# Patient Record
Sex: FEMALE | Employment: OTHER | URBAN - METROPOLITAN AREA
[De-identification: names, ages, dates, MRNs, and addresses within clinical notes are randomized per-mention and may not be internally consistent; named-entity substitution may affect disease eponyms.]

---

## 2021-11-01 ENCOUNTER — OFFICE VISIT (OUTPATIENT)
Dept: OTOLARYNGOLOGY | Age: 86
End: 2021-11-01

## 2021-11-01 VITALS — WEIGHT: 153 LBS | HEIGHT: 63 IN | BODY MASS INDEX: 27.11 KG/M2

## 2021-11-01 DIAGNOSIS — H61.23 BILATERAL IMPACTED CERUMEN: Primary | ICD-10-CM

## 2021-11-01 PROCEDURE — 69210 REMOVE IMPACTED EAR WAX UNI: CPT | Performed by: PHYSICIAN ASSISTANT

## 2021-11-01 RX ORDER — AMLODIPINE BESYLATE 2.5 MG/1
TABLET ORAL
COMMUNITY
Start: 2021-10-02

## 2021-11-01 RX ORDER — LEVOTHYROXINE SODIUM 100 UG/1
TABLET ORAL
COMMUNITY
Start: 2021-09-07

## 2021-11-01 RX ORDER — CLOPIDOGREL BISULFATE 75 MG/1
75 TABLET ORAL DAILY
COMMUNITY

## 2021-11-01 RX ORDER — OMEPRAZOLE 10 MG/1
CAPSULE, DELAYED RELEASE ORAL
COMMUNITY

## 2021-11-01 RX ORDER — ALOGLIPTIN 6.25 MG/1
TABLET, FILM COATED ORAL
COMMUNITY

## 2021-11-01 RX ORDER — PRAVASTATIN SODIUM 20 MG
TABLET ORAL
COMMUNITY
Start: 2021-09-25

## 2021-11-01 RX ORDER — ASPIRIN 325 MG
325 TABLET ORAL
COMMUNITY

## 2022-04-16 ENCOUNTER — EXTERNAL RECORD (OUTPATIENT)
Dept: HEALTH INFORMATION MANAGEMENT | Facility: OTHER | Age: 87
End: 2022-04-16

## 2022-07-12 ENCOUNTER — OFFICE VISIT (OUTPATIENT)
Dept: OTOLARYNGOLOGY | Age: 87
End: 2022-07-12

## 2022-07-12 VITALS — BODY MASS INDEX: 25.99 KG/M2 | HEIGHT: 65 IN | WEIGHT: 156 LBS

## 2022-07-12 DIAGNOSIS — H61.23 BILATERAL IMPACTED CERUMEN: Primary | ICD-10-CM

## 2022-07-12 PROCEDURE — 69210 REMOVE IMPACTED EAR WAX UNI: CPT | Performed by: PHYSICIAN ASSISTANT

## 2023-02-18 ENCOUNTER — IMMUNIZATION (OUTPATIENT)
Dept: LAB | Age: 88
End: 2023-02-18
Attending: EMERGENCY MEDICINE
Payer: MEDICARE

## 2023-02-18 DIAGNOSIS — Z23 NEED FOR VACCINATION: Primary | ICD-10-CM

## 2023-02-18 PROCEDURE — 0134A SARSCOV2 VAC BVL 50MCG/0.5ML: CPT

## 2023-11-18 ENCOUNTER — TELEPHONE (OUTPATIENT)
Dept: INTERNAL MEDICINE CLINIC | Facility: CLINIC | Age: 88
End: 2023-11-18

## 2023-11-18 NOTE — TELEPHONE ENCOUNTER
Patient scheduled through [facility] appt for new patient with Dr. King Estevez. I called today to get insurance ID number to verify benefits before appointment.

## 2023-12-13 ENCOUNTER — OFFICE VISIT (OUTPATIENT)
Dept: INTERNAL MEDICINE CLINIC | Facility: CLINIC | Age: 88
End: 2023-12-13
Payer: MEDICARE

## 2023-12-13 VITALS
WEIGHT: 143 LBS | SYSTOLIC BLOOD PRESSURE: 130 MMHG | DIASTOLIC BLOOD PRESSURE: 80 MMHG | OXYGEN SATURATION: 99 % | HEART RATE: 96 BPM

## 2023-12-13 DIAGNOSIS — R42 DYSEQUILIBRIUM: ICD-10-CM

## 2023-12-13 DIAGNOSIS — N18.30 STAGE 3 CHRONIC KIDNEY DISEASE, UNSPECIFIED WHETHER STAGE 3A OR 3B CKD (HCC): ICD-10-CM

## 2023-12-13 DIAGNOSIS — N39.0 FREQUENT UTI: ICD-10-CM

## 2023-12-13 DIAGNOSIS — R26.9 GAIT DISORDER: ICD-10-CM

## 2023-12-13 DIAGNOSIS — N18.30 TYPE 2 DIABETES MELLITUS WITH STAGE 3 CHRONIC KIDNEY DISEASE, WITHOUT LONG-TERM CURRENT USE OF INSULIN, UNSPECIFIED WHETHER STAGE 3A OR 3B CKD (HCC): Primary | ICD-10-CM

## 2023-12-13 DIAGNOSIS — N39.41 URGE INCONTINENCE: ICD-10-CM

## 2023-12-13 DIAGNOSIS — E03.9 ACQUIRED HYPOTHYROIDISM: ICD-10-CM

## 2023-12-13 DIAGNOSIS — E11.22 TYPE 2 DIABETES MELLITUS WITH STAGE 3 CHRONIC KIDNEY DISEASE, WITHOUT LONG-TERM CURRENT USE OF INSULIN, UNSPECIFIED WHETHER STAGE 3A OR 3B CKD (HCC): Primary | ICD-10-CM

## 2023-12-13 PROCEDURE — 1159F MED LIST DOCD IN RCRD: CPT | Performed by: INTERNAL MEDICINE

## 2023-12-13 PROCEDURE — 3079F DIAST BP 80-89 MM HG: CPT | Performed by: INTERNAL MEDICINE

## 2023-12-13 PROCEDURE — 99205 OFFICE O/P NEW HI 60 MIN: CPT | Performed by: INTERNAL MEDICINE

## 2023-12-13 PROCEDURE — 3075F SYST BP GE 130 - 139MM HG: CPT | Performed by: INTERNAL MEDICINE

## 2023-12-13 PROCEDURE — 1160F RVW MEDS BY RX/DR IN RCRD: CPT | Performed by: INTERNAL MEDICINE

## 2023-12-13 RX ORDER — PRAVASTATIN SODIUM 40 MG
40 TABLET ORAL NIGHTLY
COMMUNITY
Start: 2023-10-30

## 2023-12-13 RX ORDER — CLOPIDOGREL BISULFATE 75 MG/1
75 TABLET ORAL DAILY
COMMUNITY

## 2023-12-13 RX ORDER — LEVOTHYROXINE SODIUM 0.07 MG/1
75 TABLET ORAL
COMMUNITY
Start: 2023-10-30

## 2023-12-13 RX ORDER — AMLODIPINE BESYLATE 2.5 MG/1
2.5 TABLET ORAL DAILY
COMMUNITY
Start: 2021-10-02

## 2023-12-13 RX ORDER — PANTOPRAZOLE SODIUM 40 MG/1
40 TABLET, DELAYED RELEASE ORAL DAILY
COMMUNITY
Start: 2023-10-30

## 2023-12-13 NOTE — PROGRESS NOTES
Luis Johnson female 80year old    She just moved into [facility]. She was living in [place]. She has 12 children she is here with one of her daughters. All her children are alive and well. She has 20 grandchildren. She has never driven. She is independent and does her ADLs without issues. She has a walker and moves slowly due to previous CVA. Chief Complaint   Patient presents with    Establish Care    Other     Prone to UTIs, previous doctor did urinalysis every 3 months     Hx of UTI  -  uro sepsis she does not have urinary symptoms when she diagnosed however did have some subtle mental status changes. She was in the hospital in the ICU for several days. She has had subsequent urine test.  And has been treated for asymptomatic bacteria    DM -for many years at the present time her A1c has been less than 7 currently has no medications. Has no known complications    HX  of  CVA  she had some facial drooping. No sequela. Have appropriate workup although I cannot access all the reports     HTN  -currently at goal on present medications. She has hypothyroidism and takes levothyroxine      She has a walker for a very slow gait she does endorse disequilibrium. Ever since her stroke    He has some urgent continence. Of systems at the present time is negative      HISTORY:  Past Medical History:   Diagnosis Date    Diabetes ([facility] [address]     Essential hypertension     Stroke [facility])       Past Surgical History:   Procedure Laterality Date    TONSILLECTOMY        Family History   Problem Relation Age of Onset    Diabetes Brother       Social History     Socioeconomic History    Marital status:     Tobacco Use    Smoking status: Never    Smokeless tobacco: Never   Vaping Use    Vaping Use: Never used   Substance and Sexual Activity    Alcohol use: Yes     Comment: ocssionally    Drug use: Never              Patient Active Problem List   Diagnosis    Stage 3 chronic kidney disease ([address] Type 2 diabetes mellitus with stage 3 chronic kidney disease, without long-term current use of insulin ([facility])    Acquired hypothyroidism    Gait disorder    Dysequilibrium    Frequent UTI    Urge incontinence          Current Outpatient Medications on File Prior to Visit   Medication Sig Dispense Refill    amLODIPine 2.5 MG Oral Tab Take 1 tablet (2.5 mg total) by mouth daily. clopidogrel 75 MG Oral Tab Take 1 tablet (75 mg total) by mouth daily. levothyroxine 75 MCG Oral Tab Take 1 tablet (75 mcg total) by mouth before breakfast.      pantoprazole 40 MG Oral Tab EC Take 1 tablet (40 mg total) by mouth daily. pravastatin 40 MG Oral Tab Take 1 tablet (40 mg total) by mouth nightly. No current facility-administered medications on file prior to visit. Vitals:    12/13/23 1406   BP: 130/80   Pulse: 96   VITALSThere is no height or weight on file to calculate BMI. Pertinent findings on the physical exam; normal cognition appears to have good memory good affect with a sense of humor. Very pleasant she does have a walker and has significant kyphosis consistent with osteoporosis. She has significant degenerative joint disease in her knees. Woody Odor was seen today for establish care and other. Diagnoses and all orders for this visit:    Type 2 diabetes mellitus with stage 3 chronic kidney disease, without long-term current use of insulin, unspecified whether stage 3a or 3b CKD ([facility])    Stage 3 chronic kidney disease, unspecified whether stage 3a or 3b CKD ([facility] [address]    Acquired hypothyroidism    Gait disorder    Dysequilibrium    Frequent UTI    Urge incontinence       Her diabetes is currently at goal.  Labs revealed some CKD. But it seems like it has been stable recommend to continue blood pressure medications. In the future we will consider ACE or ARB. Can also consider SGLT2 medication. Recommend to make sure she sees ophthalmology.     Needs to avoid nonsteroidals with her CKD    Continue levothyroxine. She has a steady gait despite her disequilibrium recommend to continue her walker. We discussed asymptomatic bacteriuria and recommended not to treat unless she has symptoms. Reviewed up-to-date for them. Will see back in 2 months. Will renew all her present medications as needed    Continue Plavix for history of CVA    With chart review and face-to-face I spent 65 minutes on this visit. This note was prepared using [employer] voice recognition dictation software and as a result, errors may occur. When identified, these errors have been corrected.  While every attempt is made to correct errors during dictation, discrepancies may still exist

## 2024-02-20 ENCOUNTER — OFFICE VISIT (OUTPATIENT)
Dept: INTERNAL MEDICINE CLINIC | Facility: CLINIC | Age: 89
End: 2024-02-20
Payer: MEDICARE

## 2024-02-20 VITALS
TEMPERATURE: 98 F | SYSTOLIC BLOOD PRESSURE: 118 MMHG | DIASTOLIC BLOOD PRESSURE: 76 MMHG | WEIGHT: 146 LBS | OXYGEN SATURATION: 99 % | HEART RATE: 94 BPM

## 2024-02-20 DIAGNOSIS — E11.22 TYPE 2 DIABETES MELLITUS WITH STAGE 3 CHRONIC KIDNEY DISEASE, WITHOUT LONG-TERM CURRENT USE OF INSULIN, UNSPECIFIED WHETHER STAGE 3A OR 3B CKD (HCC): ICD-10-CM

## 2024-02-20 DIAGNOSIS — N39.0 FREQUENT UTI: Primary | ICD-10-CM

## 2024-02-20 DIAGNOSIS — N18.30 TYPE 2 DIABETES MELLITUS WITH STAGE 3 CHRONIC KIDNEY DISEASE, WITHOUT LONG-TERM CURRENT USE OF INSULIN, UNSPECIFIED WHETHER STAGE 3A OR 3B CKD (HCC): ICD-10-CM

## 2024-02-20 DIAGNOSIS — N18.30 STAGE 3 CHRONIC KIDNEY DISEASE, UNSPECIFIED WHETHER STAGE 3A OR 3B CKD (HCC): ICD-10-CM

## 2024-02-20 DIAGNOSIS — R42 DYSEQUILIBRIUM: ICD-10-CM

## 2024-02-20 DIAGNOSIS — E03.9 ACQUIRED HYPOTHYROIDISM: ICD-10-CM

## 2024-02-20 PROCEDURE — 3078F DIAST BP <80 MM HG: CPT | Performed by: INTERNAL MEDICINE

## 2024-02-20 PROCEDURE — 1159F MED LIST DOCD IN RCRD: CPT | Performed by: INTERNAL MEDICINE

## 2024-02-20 PROCEDURE — 1126F AMNT PAIN NOTED NONE PRSNT: CPT | Performed by: INTERNAL MEDICINE

## 2024-02-20 PROCEDURE — 99214 OFFICE O/P EST MOD 30 MIN: CPT | Performed by: INTERNAL MEDICINE

## 2024-02-20 PROCEDURE — 3074F SYST BP LT 130 MM HG: CPT | Performed by: INTERNAL MEDICINE

## 2024-02-20 PROCEDURE — 1160F RVW MEDS BY RX/DR IN RCRD: CPT | Performed by: INTERNAL MEDICINE

## 2024-02-20 NOTE — PROGRESS NOTES
Nya Worley female 89 year old    Here with her daughter.  Living at [facility]     Chief Complaint   Patient presents with    Follow - Up    No uti sxs - frequent in past we discussed symptoms.    Balance is concern has walker and has been using it consistently for more than 2 years cannot get out of a chair without using arms- worried when gets  dressed -does have a long walk to the cafeteria    Discussed diet controlled DM - on no specific diet.  And looks forward to desserts last A1c was good    No thyroid  sx       Discussed f/u of  ckd       Review of systems otherwise negative, history of CVA in the past no current symptoms is on Plavix    On medicine for hypertension.    Patient Active Problem List   Diagnosis    Stage 3 chronic kidney disease (HCC)    Type 2 diabetes mellitus with stage 3 chronic kidney disease, without long-term current use of insulin (HCC)    Acquired hypothyroidism    Gait disorder    Dysequilibrium    Frequent UTI    Urge incontinence          Current Outpatient Medications on File Prior to Visit   Medication Sig Dispense Refill    amLODIPine 2.5 MG Oral Tab Take 1 tablet (2.5 mg total) by mouth daily.      clopidogrel 75 MG Oral Tab Take 1 tablet (75 mg total) by mouth daily.      levothyroxine 75 MCG Oral Tab Take 1 tablet (75 mcg total) by mouth before breakfast.      pantoprazole 40 MG Oral Tab EC Take 1 tablet (40 mg total) by mouth daily.      pravastatin 40 MG Oral Tab Take 1 tablet (40 mg total) by mouth nightly.       No current facility-administered medications on file prior to visit.          Vitals:    02/20/24 1322   BP: 118/76   Pulse: 94   Temp: 98.4 °F (36.9 °C)   VITALSThere is no height or weight on file to calculate BMI.    Pertinent findings on the physical exam; cognition and affect are normal.  No obvious focal defects, heart regular with extra heart sounds.  Lungs clear gait is steady with walker does have difficulty standing and is  unsteady.    Nya was seen today for follow - up.    Diagnoses and all orders for this visit:    Frequent UTI  -     Urinalysis with Culture Reflex; Future    Stage 3 chronic kidney disease, unspecified whether stage 3a or 3b CKD (HCC)  -     Comp Metabolic Panel (14); Future  -     CBC With Differential With Platelet; Future    Acquired hypothyroidism  -     TSH W Reflex To Free T4; Future    Type 2 diabetes mellitus with stage 3 chronic kidney disease, without long-term current use of insulin, unspecified whether stage 3a or 3b CKD (HCC)  -     Hemoglobin A1C; Future    Dysequilibrium  -     Physical Therapy Referral - External       We discussed frequent UTIs previous doctor did a urinalysis every 3 months reviewed the literature and discussed there is no indication to do so.  We should be treating asymptomatic bacteriuria.  Discussed symptoms of concern would be dysuria pelvic pain frequency incontinence and possibly confusion etc.    Blood pressures currently at goal continue present medication    For chronic kidney disease will check labs including chemistry and CBC    Will refer to physical therapy for her disequilibrium and gait disturbance    Check TSH since has hypothyroidism              This note was prepared using Dragon [employer] voice recognition dictation software and as a result, errors may occur. When identified, these errors have been corrected. While every attempt is made to correct errors during dictation, discrepancies may still exist

## 2024-02-25 ENCOUNTER — LAB ENCOUNTER (OUTPATIENT)
Dept: LAB | Facility: REFERENCE LAB | Age: 89
End: 2024-02-25
Attending: INTERNAL MEDICINE
Payer: MEDICARE

## 2024-02-25 DIAGNOSIS — E03.9 ACQUIRED HYPOTHYROIDISM: ICD-10-CM

## 2024-02-25 DIAGNOSIS — E11.22 TYPE 2 DIABETES MELLITUS WITH STAGE 3 CHRONIC KIDNEY DISEASE, WITHOUT LONG-TERM CURRENT USE OF INSULIN, UNSPECIFIED WHETHER STAGE 3A OR 3B CKD (HCC): ICD-10-CM

## 2024-02-25 DIAGNOSIS — N18.30 TYPE 2 DIABETES MELLITUS WITH STAGE 3 CHRONIC KIDNEY DISEASE, WITHOUT LONG-TERM CURRENT USE OF INSULIN, UNSPECIFIED WHETHER STAGE 3A OR 3B CKD (HCC): ICD-10-CM

## 2024-02-25 DIAGNOSIS — N18.30 STAGE 3 CHRONIC KIDNEY DISEASE, UNSPECIFIED WHETHER STAGE 3A OR 3B CKD (HCC): ICD-10-CM

## 2024-02-25 DIAGNOSIS — N39.0 FREQUENT UTI: ICD-10-CM

## 2024-02-25 LAB
ALBUMIN SERPL-MCNC: 4 G/DL (ref 3.2–4.8)
ALBUMIN/GLOB SERPL: 1.1 {RATIO} (ref 1–2)
ALP LIVER SERPL-CCNC: 71 U/L
ALT SERPL-CCNC: 20 U/L
ANION GAP SERPL CALC-SCNC: 2 MMOL/L (ref 0–18)
AST SERPL-CCNC: 31 U/L (ref ?–34)
BASOPHILS # BLD AUTO: 0.06 X10(3) UL (ref 0–0.2)
BASOPHILS NFR BLD AUTO: 0.8 %
BILIRUB SERPL-MCNC: 1 MG/DL (ref 0.2–0.9)
BUN BLD-MCNC: 32 MG/DL (ref 9–23)
BUN/CREAT SERPL: 18.8 (ref 10–20)
CALCIUM BLD-MCNC: 10.4 MG/DL (ref 8.7–10.4)
CHLORIDE SERPL-SCNC: 105 MMOL/L (ref 98–112)
CO2 SERPL-SCNC: 29 MMOL/L (ref 21–32)
CREAT BLD-MCNC: 1.7 MG/DL
DEPRECATED RDW RBC AUTO: 51.4 FL (ref 35.1–46.3)
EGFRCR SERPLBLD CKD-EPI 2021: 28 ML/MIN/1.73M2 (ref 60–?)
EOSINOPHIL # BLD AUTO: 0.25 X10(3) UL (ref 0–0.7)
EOSINOPHIL NFR BLD AUTO: 3.5 %
ERYTHROCYTE [DISTWIDTH] IN BLOOD BY AUTOMATED COUNT: 14.5 % (ref 11–15)
EST. AVERAGE GLUCOSE BLD GHB EST-MCNC: 148 MG/DL (ref 68–126)
FASTING STATUS PATIENT QL REPORTED: NO
GLOBULIN PLAS-MCNC: 3.8 G/DL (ref 2.8–4.4)
GLUCOSE BLD-MCNC: 198 MG/DL (ref 70–99)
HBA1C MFR BLD: 6.8 % (ref ?–5.7)
HCT VFR BLD AUTO: 46.7 %
HGB BLD-MCNC: 14.9 G/DL
IMM GRANULOCYTES # BLD AUTO: 0.03 X10(3) UL (ref 0–1)
IMM GRANULOCYTES NFR BLD: 0.4 %
LYMPHOCYTES # BLD AUTO: 2.36 X10(3) UL (ref 1–4)
LYMPHOCYTES NFR BLD AUTO: 33.2 %
MCH RBC QN AUTO: 30.6 PG (ref 26–34)
MCHC RBC AUTO-ENTMCNC: 31.9 G/DL (ref 31–37)
MCV RBC AUTO: 95.9 FL
MONOCYTES # BLD AUTO: 0.46 X10(3) UL (ref 0.1–1)
MONOCYTES NFR BLD AUTO: 6.5 %
NEUTROPHILS # BLD AUTO: 3.94 X10 (3) UL (ref 1.5–7.7)
NEUTROPHILS # BLD AUTO: 3.94 X10(3) UL (ref 1.5–7.7)
NEUTROPHILS NFR BLD AUTO: 55.6 %
OSMOLALITY SERPL CALC.SUM OF ELEC: 294 MOSM/KG (ref 275–295)
PLATELET # BLD AUTO: 167 10(3)UL (ref 150–450)
POTASSIUM SERPL-SCNC: 4.2 MMOL/L (ref 3.5–5.1)
PROT SERPL-MCNC: 7.8 G/DL (ref 5.7–8.2)
RBC # BLD AUTO: 4.87 X10(6)UL
SODIUM SERPL-SCNC: 136 MMOL/L (ref 136–145)
T4 FREE SERPL-MCNC: 0.2 NG/DL (ref 0.8–1.7)
TSI SER-ACNC: 100.73 MIU/ML (ref 0.55–4.78)
WBC # BLD AUTO: 7.1 X10(3) UL (ref 4–11)

## 2024-02-25 PROCEDURE — 85025 COMPLETE CBC W/AUTO DIFF WBC: CPT

## 2024-02-25 PROCEDURE — 84439 ASSAY OF FREE THYROXINE: CPT

## 2024-02-25 PROCEDURE — 84443 ASSAY THYROID STIM HORMONE: CPT

## 2024-02-25 PROCEDURE — 36415 COLL VENOUS BLD VENIPUNCTURE: CPT

## 2024-02-25 PROCEDURE — 83036 HEMOGLOBIN GLYCOSYLATED A1C: CPT

## 2024-02-25 PROCEDURE — 80053 COMPREHEN METABOLIC PANEL: CPT

## 2024-02-26 ENCOUNTER — PATIENT MESSAGE (OUTPATIENT)
Dept: INTERNAL MEDICINE CLINIC | Facility: CLINIC | Age: 89
End: 2024-02-26

## 2024-02-26 ENCOUNTER — LAB ENCOUNTER (OUTPATIENT)
Dept: LAB | Facility: HOSPITAL | Age: 89
End: 2024-02-26
Attending: INTERNAL MEDICINE
Payer: MEDICARE

## 2024-02-26 DIAGNOSIS — E03.9 HYPOTHYROIDISM, UNSPECIFIED TYPE: Primary | ICD-10-CM

## 2024-02-26 LAB
BILIRUB UR QL: NEGATIVE
COLOR UR: YELLOW
GLUCOSE UR-MCNC: NORMAL MG/DL
HGB UR QL STRIP.AUTO: NEGATIVE
KETONES UR-MCNC: NEGATIVE MG/DL
LEUKOCYTE ESTERASE UR QL STRIP.AUTO: 500
NITRITE UR QL STRIP.AUTO: NEGATIVE
PH UR: 6 [PH] (ref 5–8)
PROT UR-MCNC: 30 MG/DL
SP GR UR STRIP: 1.02 (ref 1–1.03)
UROBILINOGEN UR STRIP-ACNC: NORMAL
WBC #/AREA URNS AUTO: >50 /HPF

## 2024-02-26 PROCEDURE — 81001 URINALYSIS AUTO W/SCOPE: CPT

## 2024-02-26 PROCEDURE — 87086 URINE CULTURE/COLONY COUNT: CPT

## 2024-02-26 PROCEDURE — 87077 CULTURE AEROBIC IDENTIFY: CPT

## 2024-02-26 RX ORDER — LEVOTHYROXINE SODIUM 0.1 MG/1
100 TABLET ORAL
Qty: 30 TABLET | Refills: 2 | Status: SHIPPED | OUTPATIENT
Start: 2024-02-26

## 2024-02-26 NOTE — TELEPHONE ENCOUNTER
Reviewed labs after receiving [employer] message from patient's Dtr re: urine culture and blood work being performed yesterday.    Abnormal/ elevated thyroid hormone levels (TSH >100), CMP (elevated BUN/Creat 32/1.7) and positive urine culture identified.    Routed results to Dr Sweeney for Rx/ treatment consideration.

## 2024-02-27 ENCOUNTER — TELEPHONE (OUTPATIENT)
Dept: INTERNAL MEDICINE CLINIC | Facility: CLINIC | Age: 89
End: 2024-02-27

## 2024-02-27 NOTE — TELEPHONE ENCOUNTER
Yulia is calling from [facility] for pt above. Yulia states that daughter of pt called them mentioning that there was an other from dr. Sweeney for physical therapy for pt above. Yulia did mention that daughter said it was for home therapy and would like to talk to rn in regards to order.      Please call yulia at [phone].

## 2024-02-27 NOTE — TELEPHONE ENCOUNTER
Returned call to Yulia Cao from [facility] referral.    Unable to reach Yulia. Vmail message left informing her hat although Dr Sweeney placed a referral to for PT sessions (12) to treat dysequilibrium/ gait disturbance, that referral is considered external and has not been approved auth'd. She explained [facility] is a [facility]    Message routed to [facility] Managed care/ Central referral pool  was not a home health referral. Order-- just std PT referral to external facility.  Per managed Care messaging-- NO referral required for [employer] P[employer] -- just a tracking convenience. PT can go anywhere [employer] will cover it w/  Order.  _________________  PT ORDER MISUNDERSTANDING  No Home Health order received by [facility]-- hence call from their intake nurse ROBERT Cao. They received a vmail from pt's Dtr whom said home PT was ordered. Hence she called Dr Sweeney to obtain the order that they did not otherwise receive.    Informed JAKE Cao RN that Dr Sweeney did not order home PT, rather just standard facility PT for patient to an external PT (non [facility] hospital site) . In reviewing last OV encounter notes- pt lives in [facility] facility, ambulates from apt to dining marie w/ walker.  No recent hospitalizations.  No \"home bound status\" or rationale documented by PCP to support a home health order.    MyChart message sent to patient's Dtr, explaining circumstance- asking if they have a preferred PT facility that [employer] will cover-- and to provide that facility name unless they prefer pt to receive OP PT at one of the [facility] facility locations.    Await Dtrs response.

## 2024-02-28 NOTE — PROGRESS NOTES
Discussed blood work and urine results.  Urine grew lactobacillus which is probable contamination.  Discussed not treating that.  TSH was high so will up levothyroxine.  Asked daughter to do a pill count.  Creatinine is stable

## 2024-03-01 ENCOUNTER — TELEPHONE (OUTPATIENT)
Dept: INTERNAL MEDICINE CLINIC | Facility: CLINIC | Age: 89
End: 2024-03-01

## 2024-03-01 NOTE — TELEPHONE ENCOUNTER
----- Message from Ruben Sweeney MD sent at 2/25/2024  7:03 PM CST -----  Please call family - her  tsh is off the chart  -  has severe   hypothyroidism  -  is on  levothyroxine  -   compliance  ??  Will need to increase  dose  if talkng it -  make sure taking it in early am  on empty stomach

## 2024-03-01 NOTE — TELEPHONE ENCOUNTER
Called and spoke to Abbie (Pt's daughter) and relayed Dr Sweeney's message. Abbie says Pt had been taking some OTC medication that she thought could replace the levothyroxine. She will go back to taking the levothyroxine 75mgs faithfully and have labs re-drawn in about 6-7 weeks and see where the levels are at before jumping to the 100mgs.

## 2024-03-18 ENCOUNTER — TELEPHONE (OUTPATIENT)
Dept: INTERNAL MEDICINE CLINIC | Facility: CLINIC | Age: 89
End: 2024-03-18

## 2024-03-18 DIAGNOSIS — R42 DYSEQUILIBRIUM: Primary | ICD-10-CM

## 2024-03-18 DIAGNOSIS — R26.9 GAIT DISORDER: ICD-10-CM

## 2024-03-18 NOTE — TELEPHONE ENCOUNTER
Brandon from [facility] is calling asking if the referral on file for physical therapy patient got approved can be changed to [facility] for physical therapy to get it done at the same facility she is currently, [facility].       If we can also send last progress notes.   If any question please call back.     Fax number to fax referral is [phone].     Please advise.

## 2024-04-02 ENCOUNTER — OFFICE VISIT (OUTPATIENT)
Dept: INTERNAL MEDICINE CLINIC | Facility: CLINIC | Age: 89
End: 2024-04-02
Payer: MEDICARE

## 2024-04-02 VITALS
SYSTOLIC BLOOD PRESSURE: 122 MMHG | OXYGEN SATURATION: 96 % | HEART RATE: 109 BPM | DIASTOLIC BLOOD PRESSURE: 62 MMHG | WEIGHT: 146 LBS

## 2024-04-02 DIAGNOSIS — E11.22 TYPE 2 DIABETES MELLITUS WITH STAGE 3 CHRONIC KIDNEY DISEASE, WITHOUT LONG-TERM CURRENT USE OF INSULIN, UNSPECIFIED WHETHER STAGE 3A OR 3B CKD (HCC): ICD-10-CM

## 2024-04-02 DIAGNOSIS — E03.9 HYPOTHYROIDISM, UNSPECIFIED TYPE: ICD-10-CM

## 2024-04-02 DIAGNOSIS — N18.30 TYPE 2 DIABETES MELLITUS WITH STAGE 3 CHRONIC KIDNEY DISEASE, WITHOUT LONG-TERM CURRENT USE OF INSULIN, UNSPECIFIED WHETHER STAGE 3A OR 3B CKD (HCC): ICD-10-CM

## 2024-04-02 DIAGNOSIS — R53.83 FATIGUE, UNSPECIFIED TYPE: Primary | ICD-10-CM

## 2024-04-02 DIAGNOSIS — N18.4 STAGE 4 CHRONIC KIDNEY DISEASE (HCC): ICD-10-CM

## 2024-04-02 PROCEDURE — 3074F SYST BP LT 130 MM HG: CPT | Performed by: INTERNAL MEDICINE

## 2024-04-02 PROCEDURE — 99214 OFFICE O/P EST MOD 30 MIN: CPT | Performed by: INTERNAL MEDICINE

## 2024-04-02 PROCEDURE — 1159F MED LIST DOCD IN RCRD: CPT | Performed by: INTERNAL MEDICINE

## 2024-04-02 PROCEDURE — 1160F RVW MEDS BY RX/DR IN RCRD: CPT | Performed by: INTERNAL MEDICINE

## 2024-04-02 PROCEDURE — 3078F DIAST BP <80 MM HG: CPT | Performed by: INTERNAL MEDICINE

## 2024-04-02 RX ORDER — LEVOTHYROXINE SODIUM 0.07 MG/1
75 TABLET ORAL
COMMUNITY

## 2024-04-02 NOTE — PROGRESS NOTES
Nya Worley female 89 year old         Chief Complaint   Patient presents with    Fatigue     For about 2-3 weeks    Lab     Fatigue  seems  like  new  - for 2 weeks  no event -  not sudden     Gets  tired  derick in evening  -  walking  down  for meals - Aisha garcia - no weakness - no  sob     No dark stools      Discussed TSH was  >>100 taking wrong  medicine - now on [address]      UTI  w/u  lactobaccilus     Has  done some  PT OT     Discussed depression  maybee -     Fell few  weeks ago lost  balance        Discussed last labs ckd      Patient Active Problem List   Diagnosis    Stage 3 chronic kidney disease (HCC)    Type 2 diabetes mellitus with stage 3 chronic kidney disease, without long-term current use of insulin (HCC)    Acquired hypothyroidism    Gait disorder    Dysequilibrium    Frequent UTI    Urge incontinence          Current Outpatient Medications on File Prior to Visit   Medication Sig Dispense Refill    levothyroxine 75 MCG Oral Tab Take 1 tablet (75 mcg total) by mouth before breakfast.      amLODIPine 2.5 MG Oral Tab Take 1 tablet (2.5 mg total) by mouth daily.      clopidogrel 75 MG Oral Tab Take 1 tablet (75 mg total) by mouth daily.      pantoprazole 40 MG Oral Tab EC Take 1 tablet (40 mg total) by mouth daily.      pravastatin 40 MG Oral Tab Take 1 tablet (40 mg total) by mouth nightly.       No current facility-administered medications on file prior to visit.          Vitals:    04/02/24 1446   BP: 122/62   Pulse: 109   VITALSThere is no height or weight on file to calculate BMI.    Pertinent findings on the physical exam; nl cog nl neuro nl color  cor reg  chest  clear  abd soft  no edema     Nya was seen today for fatigue and lab.    Diagnoses and all orders for this visit:    Fatigue, unspecified type    Hypothyroidism, unspecified type  -     TSH W Reflex To Free T4; Future    Stage 4 chronic kidney disease (HCC)  -     CBC With Differential With Platelet; Future  -     Comp  Metabolic Panel (14); Future    Type 2 diabetes mellitus with stage 3 chronic kidney disease, without long-term current use of insulin, unspecified whether stage 3a or 3b CKD (HCC)       We discussed  vagueness of fatigue  -  no obvious etiology - seems like started  after thyroid  replacement     No obvious cardiac sx     Doubt myasthenia or neuro dz     Perhaps depression     Will recheck  labs  fo  ckd -  r/o anemia       Bp at [facility]  we weened down norvasc               This note was prepared using Dragon Medical voice recognition dictation software and as a result, errors may occur. When identified, these errors have been corrected. While every attempt is made to correct errors during dictation, discrepancies may still exist

## 2024-04-04 ENCOUNTER — TELEPHONE (OUTPATIENT)
Dept: INTERNAL MEDICINE CLINIC | Facility: CLINIC | Age: 89
End: 2024-04-04

## 2024-04-05 ENCOUNTER — PATIENT MESSAGE (OUTPATIENT)
Dept: INTERNAL MEDICINE CLINIC | Facility: CLINIC | Age: 89
End: 2024-04-05

## 2024-04-05 RX ORDER — AMLODIPINE BESYLATE 2.5 MG/1
2.5 TABLET ORAL DAILY
Qty: 90 TABLET | Refills: 1 | Status: SHIPPED | OUTPATIENT
Start: 2024-04-05 | End: 2024-04-05

## 2024-04-05 RX ORDER — AMLODIPINE BESYLATE 2.5 MG/1
2.5 TABLET ORAL DAILY
Qty: 30 TABLET | Refills: 0 | Status: SHIPPED | OUTPATIENT
Start: 2024-04-05

## 2024-04-05 RX ORDER — PANTOPRAZOLE SODIUM 40 MG/1
40 TABLET, DELAYED RELEASE ORAL DAILY
Qty: 90 TABLET | Refills: 1 | Status: SHIPPED | OUTPATIENT
Start: 2024-04-05

## 2024-04-05 RX ORDER — CLOPIDOGREL BISULFATE 75 MG/1
75 TABLET ORAL DAILY
Qty: 90 TABLET | Refills: 1 | Status: SHIPPED | OUTPATIENT
Start: 2024-04-05

## 2024-04-05 NOTE — TELEPHONE ENCOUNTER
Daughter of pt is calling stating that if amlodipine can be sent to [facility] in [place] since pt has ran out. If could please send a [employer] message to let daughter know that medication has been sent.

## 2024-04-05 NOTE — TELEPHONE ENCOUNTER
You have not prescribed these yet, but she was just here Tuesday.   Requested Prescriptions     Pending Prescriptions Disp Refills    amLODIPine 2.5 MG Oral Tab  0     Sig: Take 1 tablet (2.5 mg total) by mouth daily.    clopidogrel 75 MG Oral Tab  0     Sig: Take 1 tablet (75 mg total) by mouth daily.    pantoprazole 40 MG Oral Tab EC  0     Sig: Take 1 tablet (40 mg total) by mouth daily.     LAST REFILL DATE    QUANTITY REQUESTED    DAY SUPPLY    DIAGNOSIS    LAST OFFICE VISIT  4/2/24   FOLLOW UP DUE    No future appointments.

## 2024-04-09 ENCOUNTER — LAB ENCOUNTER (OUTPATIENT)
Dept: LAB | Facility: HOSPITAL | Age: 89
End: 2024-04-09
Attending: INTERNAL MEDICINE
Payer: MEDICARE

## 2024-04-09 DIAGNOSIS — N18.4 STAGE 4 CHRONIC KIDNEY DISEASE (HCC): ICD-10-CM

## 2024-04-09 DIAGNOSIS — E03.9 HYPOTHYROIDISM, UNSPECIFIED TYPE: ICD-10-CM

## 2024-04-09 LAB
ALBUMIN SERPL-MCNC: 4.2 G/DL (ref 3.2–4.8)
ALBUMIN/GLOB SERPL: 1.1 {RATIO} (ref 1–2)
ALP LIVER SERPL-CCNC: 95 U/L
ALT SERPL-CCNC: <7 U/L
ANION GAP SERPL CALC-SCNC: 4 MMOL/L (ref 0–18)
AST SERPL-CCNC: 19 U/L (ref ?–34)
BASOPHILS # BLD AUTO: 0.12 X10(3) UL (ref 0–0.2)
BASOPHILS NFR BLD AUTO: 1.6 %
BILIRUB SERPL-MCNC: 0.8 MG/DL (ref 0.2–0.9)
BUN BLD-MCNC: 33 MG/DL (ref 9–23)
BUN/CREAT SERPL: 20.4 (ref 10–20)
CALCIUM BLD-MCNC: 10.6 MG/DL (ref 8.7–10.4)
CHLORIDE SERPL-SCNC: 107 MMOL/L (ref 98–112)
CO2 SERPL-SCNC: 28 MMOL/L (ref 21–32)
CREAT BLD-MCNC: 1.62 MG/DL
DEPRECATED RDW RBC AUTO: 54.8 FL (ref 35.1–46.3)
EGFRCR SERPLBLD CKD-EPI 2021: 30 ML/MIN/1.73M2 (ref 60–?)
EOSINOPHIL # BLD AUTO: 0.83 X10(3) UL (ref 0–0.7)
EOSINOPHIL NFR BLD AUTO: 10.9 %
ERYTHROCYTE [DISTWIDTH] IN BLOOD BY AUTOMATED COUNT: 15.4 % (ref 11–15)
FASTING STATUS PATIENT QL REPORTED: NO
GLOBULIN PLAS-MCNC: 3.7 G/DL (ref 2.8–4.4)
GLUCOSE BLD-MCNC: 135 MG/DL (ref 70–99)
HCT VFR BLD AUTO: 39.4 %
HGB BLD-MCNC: 13.2 G/DL
IMM GRANULOCYTES # BLD AUTO: 0.02 X10(3) UL (ref 0–1)
IMM GRANULOCYTES NFR BLD: 0.3 %
LYMPHOCYTES # BLD AUTO: 2.18 X10(3) UL (ref 1–4)
LYMPHOCYTES NFR BLD AUTO: 28.5 %
MCH RBC QN AUTO: 32.5 PG (ref 26–34)
MCHC RBC AUTO-ENTMCNC: 33.5 G/DL (ref 31–37)
MCV RBC AUTO: 97 FL
MONOCYTES # BLD AUTO: 0.62 X10(3) UL (ref 0.1–1)
MONOCYTES NFR BLD AUTO: 8.1 %
NEUTROPHILS # BLD AUTO: 3.87 X10 (3) UL (ref 1.5–7.7)
NEUTROPHILS # BLD AUTO: 3.87 X10(3) UL (ref 1.5–7.7)
NEUTROPHILS NFR BLD AUTO: 50.6 %
OSMOLALITY SERPL CALC.SUM OF ELEC: 297 MOSM/KG (ref 275–295)
PLATELET # BLD AUTO: 178 10(3)UL (ref 150–450)
POTASSIUM SERPL-SCNC: 3.8 MMOL/L (ref 3.5–5.1)
PROT SERPL-MCNC: 7.9 G/DL (ref 5.7–8.2)
RBC # BLD AUTO: 4.06 X10(6)UL
SODIUM SERPL-SCNC: 139 MMOL/L (ref 136–145)
T4 FREE SERPL-MCNC: 1.1 NG/DL (ref 0.8–1.7)
TSI SER-ACNC: 17.66 MIU/ML (ref 0.55–4.78)
WBC # BLD AUTO: 7.6 X10(3) UL (ref 4–11)

## 2024-04-09 PROCEDURE — 84439 ASSAY OF FREE THYROXINE: CPT

## 2024-04-09 PROCEDURE — 85025 COMPLETE CBC W/AUTO DIFF WBC: CPT

## 2024-04-09 PROCEDURE — 36415 COLL VENOUS BLD VENIPUNCTURE: CPT

## 2024-04-09 PROCEDURE — 84443 ASSAY THYROID STIM HORMONE: CPT

## 2024-04-09 PROCEDURE — 80053 COMPREHEN METABOLIC PANEL: CPT

## 2024-04-10 ENCOUNTER — PATIENT MESSAGE (OUTPATIENT)
Dept: INTERNAL MEDICINE CLINIC | Facility: CLINIC | Age: 89
End: 2024-04-10

## 2024-04-10 DIAGNOSIS — E03.9 HYPOTHYROIDISM, UNSPECIFIED TYPE: Primary | ICD-10-CM

## 2024-04-11 RX ORDER — LEVOTHYROXINE SODIUM 0.07 MG/1
75 TABLET ORAL
Qty: 90 TABLET | Refills: 1 | Status: SHIPPED | OUTPATIENT
Start: 2024-04-11

## 2024-04-23 NOTE — TELEPHONE ENCOUNTER
You have not prescribed this before.  Requested Prescriptions     Pending Prescriptions Disp Refills    pravastatin 40 MG Oral Tab  0     Sig: Take 1 tablet (40 mg total) by mouth nightly.     LAST REFILL DATE    QUANTITY REQUESTED    DAY SUPPLY    DIAGNOSIS    LAST OFFICE VISIT  4/2/24   FOLLOW UP DUE    No future appointments.

## 2024-04-24 RX ORDER — PRAVASTATIN SODIUM 40 MG
40 TABLET ORAL NIGHTLY
Qty: 90 TABLET | Refills: 2 | Status: SHIPPED | OUTPATIENT
Start: 2024-04-24

## 2024-06-19 ENCOUNTER — TELEPHONE (OUTPATIENT)
Dept: INTERNAL MEDICINE CLINIC | Facility: CLINIC | Age: 89
End: 2024-06-19

## 2024-06-22 LAB — AMB EXT TSH: 11.5 MIU/ML

## 2024-06-25 LAB
T3, FREE: 1.9
T4,FREE(DIRECT): 1.44 NG/DL

## 2024-07-12 ENCOUNTER — OFFICE VISIT (OUTPATIENT)
Dept: INTERNAL MEDICINE CLINIC | Facility: CLINIC | Age: 89
End: 2024-07-12
Payer: MEDICARE

## 2024-07-12 VITALS
DIASTOLIC BLOOD PRESSURE: 64 MMHG | WEIGHT: 150 LBS | HEART RATE: 74 BPM | SYSTOLIC BLOOD PRESSURE: 124 MMHG | OXYGEN SATURATION: 96 %

## 2024-07-12 DIAGNOSIS — I48.91 ATRIAL FIBRILLATION, UNSPECIFIED TYPE (HCC): Primary | ICD-10-CM

## 2024-07-12 DIAGNOSIS — I49.9 IRREGULAR HEART RHYTHM: ICD-10-CM

## 2024-07-12 DIAGNOSIS — H61.22 IMPACTED CERUMEN OF LEFT EAR: ICD-10-CM

## 2024-07-12 DIAGNOSIS — E11.22 TYPE 2 DIABETES MELLITUS WITH STAGE 4 CHRONIC KIDNEY DISEASE, WITHOUT LONG-TERM CURRENT USE OF INSULIN (HCC): ICD-10-CM

## 2024-07-12 DIAGNOSIS — R42 DYSEQUILIBRIUM: ICD-10-CM

## 2024-07-12 DIAGNOSIS — N18.4 STAGE 4 CHRONIC KIDNEY DISEASE (HCC): ICD-10-CM

## 2024-07-12 DIAGNOSIS — N18.4 TYPE 2 DIABETES MELLITUS WITH STAGE 4 CHRONIC KIDNEY DISEASE, WITHOUT LONG-TERM CURRENT USE OF INSULIN (HCC): ICD-10-CM

## 2024-07-12 DIAGNOSIS — R26.9 GAIT DISORDER: ICD-10-CM

## 2024-07-12 DIAGNOSIS — E03.9 HYPOTHYROIDISM, UNSPECIFIED TYPE: ICD-10-CM

## 2024-07-12 PROBLEM — N18.30 STAGE 3 CHRONIC KIDNEY DISEASE (HCC): Status: RESOLVED | Noted: 2023-12-13 | Resolved: 2024-07-12

## 2024-07-12 PROBLEM — N39.0 FREQUENT UTI: Status: RESOLVED | Noted: 2023-12-13 | Resolved: 2024-07-12

## 2024-07-12 PROBLEM — N18.30 TYPE 2 DIABETES MELLITUS WITH STAGE 3 CHRONIC KIDNEY DISEASE, WITHOUT LONG-TERM CURRENT USE OF INSULIN (HCC): Status: RESOLVED | Noted: 2023-12-13 | Resolved: 2024-07-12

## 2024-07-12 LAB
ATRIAL RATE: 267 BPM
Q-T INTERVAL: 378 MS
QRS DURATION: 92 MS
QTC CALCULATION (BEZET): 441 MS
R AXIS: 128 DEGREES
T AXIS: 52 DEGREES
VENTRICULAR RATE: 82 BPM

## 2024-07-12 RX ORDER — LEVOTHYROXINE SODIUM 0.1 MG/1
100 TABLET ORAL
COMMUNITY

## 2024-07-12 NOTE — PROGRESS NOTES
Nya Worley is a 89 year old  who presents for a MA AHA (Medicare Advantage Annual Health Assessment)    Discussed medicare wellness , reviewed assessments and health maintenance for screening and immunizations.    In addition medical issues  addressed today included ; TSH better  and  on higher dose  100     Fatigue  better     Cva  many years  ago -  on statin and  plavix does not remember being on aspirin.  No bleeding issues        On PPI  -  no clear  reason      Allergies:   has no allergies on file.  Medical History:    has a past medical history of Diabetes ([facility]ly [facility]), Essential hypertension, and Stroke ([facility]ly [facility]).  Surgical History:    has a past surgical history that includes tonsillectomy.   Family History:   family history includes Diabetes in her brother.  Social History:    reports that she has never smoked. She has never used smokeless tobacco. She reports current alcohol use. She reports that she does not use drugs.        Fall Risk Assessment:   She has been screened for Falls and is High Risk. Fall Prevention information provided to patient in After Visit Summary.    Do you feel unsteady when standing or walking?: Yes  Do you worry about falling?: Yes  Have you fallen in the past year?: Yes  How many times have you fallen?: (P) 2  Were you injured?: (P) No   Has  walker  for balance  and  strength    long  time  - did recent  PT  was  told  stay on walker   Cognitive Assessment:   She had a completely normal cognitive assessment - see flowsheet entries     Functional Ability/Status:   Nya Worley has some abnormal functions as listed below:  She has Driving difficulties based on screening of functional status. She has difficulties Shopping for Groceries based on screening of functional status. She has Hearing problems based on screening of functional status.She has Walking problems based on screening of functional status.    Hearing  decreased        Depression Screening (PHQ-2/PHQ-9): PHQ-2  SCORE: 0  , done 7/12/2024          Advanced Directives:   Discussed advance directives  -  no heroic  measures -  look for reversable  issues      Tobacco:  She has never smoked tobacco.    CAGE Alcohol Screen:   CAGE screening score of 0 on 7/12/2024, showing low risk of alcohol abuse.          Patient Care Team:  Ruben Sweeney MD as PCP - General (Internal Medicine)          Objective:   /64 (BP Location: Right arm, Patient Position: Sitting, Cuff Size: adult)   Pulse 74   Wt 150 lb (68 kg)   SpO2 96%    There is no height or weight on file to calculate BMI.    Head/neck ; nl -  wax  right ear     Lungs: Clear     Heart: irregular--we did EKG in the office and it showed atrial fibrillation    Abdomen normal  Ext; no edema    Neurological: No focal deficit, nl cognition   Walks very slowly needs help getting off the table has walker      Medicare Hearing Assessment:  Hearing Screening    Time taken: 7/12/2024 12:56 PM  Entry User: Ruben Sweeney MD  Screening Method: Finger Rub  Finger Rub Result: Pass       Visual Acuity:   Visual Acuity:   Right Eye Visual Acuity: Corrected Right Eye Chart Acuity: 20/25   Left Eye Visual Acuity: Corrected Left Eye Chart Acuity: 20/25   Both Eyes Visual Acuity: Corrected Both Eyes Chart Acuity: 20/25   Able To Tolerate Visual Acuity: Yes        Current Outpatient Medications:     levothyroxine 100 MCG Oral Tab, Take 1 tablet (100 mcg total) by mouth before breakfast., Disp: , Rfl:     apixaban 2.5 MG Oral Tab, Take 1 tablet (2.5 mg total) by mouth 2 (two) times daily., Disp: 60 tablet, Rfl: 0    pravastatin 40 MG Oral Tab, Take 1 tablet (40 mg total) by mouth nightly., Disp: 90 tablet, Rfl: 2    pantoprazole 40 MG Oral Tab EC, Take 1 tablet (40 mg total) by mouth daily., Disp: 90 tablet, Rfl: 1    amLODIPine 2.5 MG Oral Tab, Take 1 tablet (2.5 mg total) by mouth daily., Disp: 30 tablet, Rfl: 0     ASSESSMENT  and   PLAN    Medicare wellness  Dicussed prevention  screening test and immunization for age  Reinforced healthy diet, lifestyle, and exercise. Discussed current state of health.    Medical issues     1. Atrial fibrillation, unspecified type (HCC) (Primary)-this is a new diagnosis.  Discussed treatment with anticoagulation to avoid strokes discussed CHADS2 score which is quite high with age diabetes and previous CVA.  Recommend Eliquis due to her CKD and age gave a dose of 2.5 twice a day.  Aware of risk of bleeding  2. Irregular heart rhythm-  -     ELECTROCARDIOGRAM, COMPLETE  3. Impacted cerumen of left ear-wax was curetted out.  -     Removal Impacted Cerumen Requiring Instrumentation, Unilateral  4. Hypothyroidism, unspecified type-TSH has come down from originally 100 now down to 11 have increased medicine very slowly.  Now on 100-perhaps could have contributed to A-fib but certainly needs to be treated   5. Gait disorder-only did physical therapy recommend to continue using her walker  6. Dysequilibrium-as above, multifactorial etiology  7. Type 2 diabetes mellitus with stage 4 chronic kidney disease, without -long-term current use of insulin (HCC)-treating this without medications at the present time we will need to keep an eye on it.  8. Stage 4 chronic kidney disease (HCC)-will continue to monitor.  Avoid nephrotoxic drugs will start Eliquis at low-dose.  Avoid nonsteroidals  Other orders  -     Apixaban; Take 1 tablet (2.5 mg total) by mouth 2 (two) times daily.  Dispense: 60 tablet; Refill: 0                No follow-ups on file.     Ruben Sweeney MD      General Health:  In the past six months, have you lost more than 10 pounds without trying?: 2 - No  Has your appetite been poor?: No  Type of Diet: Balanced  How does the patient maintain a good energy level?: Appropriate Exercise;Daily Walks;Stretching  How would you describe your daily physical activity?: Moderate  How would you describe your current health state?: Good  How do you maintain positive  mental well-being?: Social Interaction;Puzzles;Games;Visiting Friends;Visiting Family  On a scale of 0 to 10, with 0 being no pain and 10 being severe pain, what is your pain level?: 1 - (Mild)  In the past six months, have you experienced urine leakage?: 1-Yes  At any time do you feel concerned for the safety/well-being of yourself and/or your children, in your home or elsewhere?: No  Have you had any immunizations at another office such as Influenza, Hepatitis B, Tetanus, or Pneumococcal?: No       Nya Worley's SCREENING SCHEDULE   Tests on this list are recommended by your physician but may not be covered, or covered at this frequency, by your insurer.   Please check with your insurance carrier before scheduling to verify coverage.   PREVENTATIVE SERVICES FREQUENCY &  COVERAGE DETAILS LAST COMPLETION DATE   Diabetes Screening    Fasting Blood Sugar /  Glucose    One screening every 12 months if never tested or if previously tested but not diagnosed with pre-diabetes   One screening every 6 months if diagnosed with pre-diabetes Lab Results   Component Value Date     (H) 04/09/2024        Cardiovascular Disease Screening    Lipid Panel  Cholesterol  Lipoprotein (HDL)  Triglycerides Covered every 5 years for all Medicare beneficiaries without apparent signs or symptoms of cardiovascular disease No results found for: \"CHOLEST\", \"HDL\", \"LDL\", \"LDLD\", \"LDLC\", \"TRIG\"      Electrocardiogram (EKG)   Covered if needed at Welcome to Medicare, and non-screening if indicated for medical reasons -      Ultrasound Screening for Abdominal Aortic Aneurysm (AAA) Covered once in a lifetime for one of the following risk factors    Men who are 65-75 years old and have ever smoked    Anyone with a family history -     Colorectal Cancer Screening  Covered for ages 50-85; only need ONE of the following:    Colonoscopy   Covered every 10 years    Covered every 2 years if patient is at high risk or previous colonoscopy was  abnormal -    No recommendations at this time    Flexible Sigmoidoscopy   Covered every 4 years -    Fecal Occult Blood Test Covered annually -   Bone Density Screening    Bone density screening    Covered every 2 years after age 65 if diagnosed with risk of osteoporosis or estrogen deficiency.    Covered yearly for long-term glucocorticoid medication use (Steroids) No results found for this or any previous visit.      No recommendations at this time   Pap and Pelvic    Pap   Covered every 2 years for women at normal risk; Annually if at high risk -  No recommendations at this time    Chlamydia Annually if high risk -  No recommendations at this time   Screening Mammogram    Mammogram     Recommend annually for all female patients aged 40 and older    One baseline mammogram covered for patients aged 35-39 -    No recommendations at this time    Immunizations    Influenza Covered once per flu season  Please get every year -  No recommendations at this time    Pneumococcal Each vaccine (Cytikcg31 & [id number]) covered once after 65 Prevnar 13: -    [id number]: -     Pneumococcal Vaccination(1 of 2 - PCV) Never done    Hepatitis B One screening covered for patients with certain risk factors   -  No recommendations at this time    Tetanus Toxoid Not covered by Medicare Part B unless medically necessary (cut with metal); may be covered with your pharmacy prescription benefits -    Tetanus, Diptheria and Pertusis TD and TDaP Not covered by Medicare Part B -  No recommendations at this time    Zoster Not covered by Medicare Part B; may be covered with your pharmacy  prescription benefits -  Zoster Vaccines(1 of 2) Never done     Diabetes      Hemoglobin A1C Annually; if last result is elevated, may be asked to retest more frequently.    Medicare covers every 3 months Lab Results   Component Value Date     (H) 02/25/2024    A1C 6.8 (H) 02/25/2024       No recommendations at this time    Creat/alb ratio Annually No  results found for: \"MICROALBCREA\", \"MALBCRECALC\"    LDL Annually No results found for: \"LDL\", \"LDLC\"    Dilated Eye Exam Annually Last Diabetic Eye Exam:  No data recorded  No data recorded

## 2024-08-09 ENCOUNTER — OFFICE VISIT (OUTPATIENT)
Dept: INTERNAL MEDICINE CLINIC | Facility: CLINIC | Age: 89
End: 2024-08-09
Payer: MEDICARE

## 2024-08-09 VITALS
HEART RATE: 97 BPM | OXYGEN SATURATION: 96 % | DIASTOLIC BLOOD PRESSURE: 60 MMHG | SYSTOLIC BLOOD PRESSURE: 98 MMHG | WEIGHT: 149 LBS

## 2024-08-09 DIAGNOSIS — N18.4 STAGE 4 CHRONIC KIDNEY DISEASE (HCC): ICD-10-CM

## 2024-08-09 DIAGNOSIS — N18.4 TYPE 2 DIABETES MELLITUS WITH STAGE 4 CHRONIC KIDNEY DISEASE, WITHOUT LONG-TERM CURRENT USE OF INSULIN (HCC): ICD-10-CM

## 2024-08-09 DIAGNOSIS — N39.0 E-COLI UTI: ICD-10-CM

## 2024-08-09 DIAGNOSIS — B96.20 E-COLI UTI: ICD-10-CM

## 2024-08-09 DIAGNOSIS — I48.91 ATRIAL FIBRILLATION, UNSPECIFIED TYPE (HCC): Primary | ICD-10-CM

## 2024-08-09 DIAGNOSIS — E11.22 TYPE 2 DIABETES MELLITUS WITH STAGE 4 CHRONIC KIDNEY DISEASE, WITHOUT LONG-TERM CURRENT USE OF INSULIN (HCC): ICD-10-CM

## 2024-08-09 DIAGNOSIS — E03.9 HYPOTHYROIDISM, UNSPECIFIED TYPE: ICD-10-CM

## 2024-08-09 DIAGNOSIS — R26.9 GAIT DISORDER: ICD-10-CM

## 2024-08-09 LAB — HEMOGLOBIN A1C: 7.7 % (ref 4.3–5.6)

## 2024-08-09 RX ORDER — CEFUROXIME AXETIL 500 MG/1
500 TABLET ORAL 2 TIMES DAILY
COMMUNITY

## 2024-08-09 NOTE — PROGRESS NOTES
Nya Meir female 89 year old         Chief Complaint   Patient presents with    Medication Follow-Up     After starting Eliquis her knees started hurting    ER F/U    Fall     S/p fall  - we recently started on  eliquis -  seen in ED  CT neg  - still on eliquis     No  neuro cog issues or neck pain       Was started on lopresor  for afib  rate  110 in ER  - some fatigue       Her  TSH  coming  down  on treatment      Had high sugar in ED     Also dx  UTI  - treated       Trop up - prob ckd     Also  slipped onto  coccyx      In indepent living now -  - discussed ambulation long walk to dining room      [facility] for  scooter ??       Patient Active Problem List   Diagnosis    Acquired hypothyroidism    Gait disorder    Dysequilibrium    Urge incontinence    Stage 4 chronic kidney disease (HCC)    Atrial fibrillation (HCC)          Current Outpatient Medications on File Prior to Visit   Medication Sig Dispense Refill    metoprolol tartrate 25 MG Oral Tab Take 1 tablet (25 mg total) by mouth 2 (two) times daily.      cefuroxime 500 MG Oral Tab Take 1 tablet (500 mg total) by mouth 2 (two) times daily.      levothyroxine 100 MCG Oral Tab Take 1 tablet (100 mcg total) by mouth before breakfast.      apixaban 2.5 MG Oral Tab Take 1 tablet (2.5 mg total) by mouth 2 (two) times daily. 60 tablet 0    pravastatin 40 MG Oral Tab Take 1 tablet (40 mg total) by mouth nightly. 90 tablet 2    pantoprazole 40 MG Oral Tab EC Take 1 tablet (40 mg total) by mouth daily. 90 tablet 1    amLODIPine 2.5 MG Oral Tab Take 1 tablet (2.5 mg total) by mouth daily. 30 tablet 0     No current facility-administered medications on file prior to visit.          Vitals:    08/09/24 1003   BP: 98/60   Pulse: 97   VITALSThere is no height or weight on file to calculate BMI.    Pertinent findings on the physical exam; big  goosegg-back of head  ecchymosis  down to neck -  neuro nl no neck  supper  slow  sloth  like  -       Nya was  seen today for medication follow-up, er f/u and fall.    Diagnoses and all orders for this visit:    Atrial fibrillation, unspecified type (HCC)    Hypothyroidism, unspecified type    Stage 4 chronic kidney disease (HCC)    Type 2 diabetes mellitus with stage 4 chronic kidney disease, without long-term current use of insulin (HCC)  -     POC Glycohemoglobin [08059]    E-coli UTI    Gait disorder         Stop  eliquis  since fall risk seems higher  than benefit  discussed for  extented time       Finish  abx for ecoli  UTI  dx in ED - had no sxs     Keep on  lopressor  and  thyroid meds     Had long talk about  falling   and  therapy -  devices  assisted living  breaking hip  -  - will do more therapy oon own       Discussed DM and  diet  -   A1c  7.7 may need meds  since ckd no metformin       This was extended  visit  lasted 42 min  with  review and discussion about  eliquis  and falling  and   future  -          This note was prepared using Dragon Medical voice recognition dictation software and as a result, errors may occur. When identified, these errors have been corrected. While every attempt is made to correct errors during dictation, discrepancies may still exist

## 2024-08-16 ENCOUNTER — PATIENT MESSAGE (OUTPATIENT)
Dept: INTERNAL MEDICINE CLINIC | Facility: CLINIC | Age: 89
End: 2024-08-16

## 2024-08-29 ENCOUNTER — PATIENT MESSAGE (OUTPATIENT)
Dept: INTERNAL MEDICINE CLINIC | Facility: CLINIC | Age: 89
End: 2024-08-29

## 2024-08-29 RX ORDER — METOPROLOL TARTRATE 25 MG/1
25 TABLET, FILM COATED ORAL 2 TIMES DAILY
Refills: 0 | Status: CANCELLED | OUTPATIENT
Start: 2024-08-29

## 2024-08-30 RX ORDER — METOPROLOL TARTRATE 25 MG/1
25 TABLET, FILM COATED ORAL 2 TIMES DAILY
Qty: 180 TABLET | Refills: 0 | Status: SHIPPED | OUTPATIENT
Start: 2024-08-30 | End: 2024-09-03

## 2024-09-03 RX ORDER — AMLODIPINE BESYLATE 2.5 MG/1
2.5 TABLET ORAL DAILY
Qty: 90 TABLET | Refills: 3 | Status: SHIPPED | OUTPATIENT
Start: 2024-09-03 | End: 2024-09-03

## 2024-09-03 RX ORDER — PANTOPRAZOLE SODIUM 40 MG/1
40 TABLET, DELAYED RELEASE ORAL DAILY
Qty: 90 TABLET | Refills: 3 | Status: SHIPPED | OUTPATIENT
Start: 2024-09-03

## 2024-09-03 RX ORDER — METOPROLOL TARTRATE 25 MG/1
25 TABLET, FILM COATED ORAL 2 TIMES DAILY
Qty: 180 TABLET | Refills: 0 | Status: SHIPPED | OUTPATIENT
Start: 2024-09-03

## 2024-09-03 RX ORDER — CLOPIDOGREL BISULFATE 75 MG/1
75 TABLET ORAL DAILY
Qty: 90 TABLET | Refills: 3 | Status: SHIPPED | OUTPATIENT
Start: 2024-09-03

## 2024-09-23 ENCOUNTER — HOSPITAL ENCOUNTER (OUTPATIENT)
Dept: GENERAL RADIOLOGY | Age: 89
Discharge: HOME | End: 2024-09-23
Attending: INTERNAL MEDICINE
Payer: MEDICARE

## 2024-09-23 ENCOUNTER — OFFICE VISIT (OUTPATIENT)
Dept: INTERNAL MEDICINE CLINIC | Facility: CLINIC | Age: 89
End: 2024-09-23
Payer: MEDICARE

## 2024-09-23 VITALS
OXYGEN SATURATION: 98 % | HEART RATE: 81 BPM | WEIGHT: 151.81 LBS | DIASTOLIC BLOOD PRESSURE: 64 MMHG | SYSTOLIC BLOOD PRESSURE: 136 MMHG | RESPIRATION RATE: 18 BRPM

## 2024-09-23 DIAGNOSIS — M25.562 ACUTE PAIN OF BOTH KNEES: ICD-10-CM

## 2024-09-23 DIAGNOSIS — I73.9 PVD (PERIPHERAL VASCULAR DISEASE) (HCC): ICD-10-CM

## 2024-09-23 DIAGNOSIS — M25.561 ACUTE PAIN OF BOTH KNEES: Primary | ICD-10-CM

## 2024-09-23 DIAGNOSIS — M25.561 ACUTE PAIN OF BOTH KNEES: ICD-10-CM

## 2024-09-23 DIAGNOSIS — I48.91 ATRIAL FIBRILLATION, UNSPECIFIED TYPE (HCC): ICD-10-CM

## 2024-09-23 DIAGNOSIS — E11.9 TYPE 2 DIABETES MELLITUS WITHOUT COMPLICATION, WITHOUT LONG-TERM CURRENT USE OF INSULIN (HCC): ICD-10-CM

## 2024-09-23 DIAGNOSIS — M25.562 ACUTE PAIN OF BOTH KNEES: Primary | ICD-10-CM

## 2024-09-23 DIAGNOSIS — I27.20 PULMONARY HTN (HCC): ICD-10-CM

## 2024-09-23 PROCEDURE — 3078F DIAST BP <80 MM HG: CPT | Performed by: INTERNAL MEDICINE

## 2024-09-23 PROCEDURE — 99214 OFFICE O/P EST MOD 30 MIN: CPT | Performed by: INTERNAL MEDICINE

## 2024-09-23 PROCEDURE — 3075F SYST BP GE 130 - 139MM HG: CPT | Performed by: INTERNAL MEDICINE

## 2024-09-23 PROCEDURE — 1159F MED LIST DOCD IN RCRD: CPT | Performed by: INTERNAL MEDICINE

## 2024-09-23 PROCEDURE — G2211 COMPLEX E/M VISIT ADD ON: HCPCS | Performed by: INTERNAL MEDICINE

## 2024-09-23 PROCEDURE — 1170F FXNL STATUS ASSESSED: CPT | Performed by: INTERNAL MEDICINE

## 2024-09-23 PROCEDURE — 73562 X-RAY EXAM OF KNEE 3: CPT | Performed by: INTERNAL MEDICINE

## 2024-09-23 PROCEDURE — 1160F RVW MEDS BY RX/DR IN RCRD: CPT | Performed by: INTERNAL MEDICINE

## 2024-09-23 NOTE — PROGRESS NOTES
Nya Worley female 89 year old    Here  with daughter      Chief Complaint   Patient presents with    Follow - Up     Follow  up on fall - no further falls -  going  to exercise   room  -  no PT  due to insurance issues       We stopped  eliquis - rediscussewd     C/o knee pain - no known  history   left  > right  no swelling      Discussed  hx of PAD  no obvious claudication -is on plavix      No breathing  issues  - has  pul htn      Previous  test in care everywhere ;    Impression: Diffuse PAD   *  On the RIGHT - likely multifocal hemodynamically significant stenosis in the mid SFA and the distal popliteal/TP trunk causing distal waveform dampening   *  On the LEFT - likely more focal significant disease at the CFA as well as the popliteal/TP trunk causing distal waveform dampening     Patient may benefit from angiogram evaluation.     Electronically Signed By: Omi Dee MD on 6/8/2023 12:46 PM CDT     Echo   2016    The right heart pressure is moderately elevated, estimated pulmonary     artery systolic pressure of 45-50   mmHg, consistent with moderate     pulmonary hypertension. .  Correlation with other echo findings     recommended.       On cpap in past  - was told doesn't  need it          discussed last  A1c  7.7 - no meds  meds now      Discussed meds      Patient Active Problem List   Diagnosis    Acquired hypothyroidism    Gait disorder    Dysequilibrium    Urge incontinence    Stage 4 chronic kidney disease (HCC)    Atrial fibrillation (HCC)    Pulmonary HTN (HCC)    PVD (peripheral vascular disease) (HCC)    Diabetes mellitus, type 2 (pick complications) (HCC)          Current Outpatient Medications on File Prior to Visit   Medication Sig Dispense Refill    CLOPIDOGREL 75 MG Oral Tab TAKE 1 TABLET (75 MG TOTAL) BY MOUTH DAILY. 90 tablet 3    PANTOPRAZOLE 40 MG Oral Tab EC TAKE 1 TABLET (40 MG TOTAL) BY MOUTH DAILY. 90 tablet 3    metoprolol tartrate 25 MG Oral Tab Take 1 tablet (25 mg  total) by mouth 2 (two) times daily. 180 tablet 0    levothyroxine 100 MCG Oral Tab Take 1 tablet (100 mcg total) by mouth before breakfast.      pravastatin 40 MG Oral Tab Take 1 tablet (40 mg total) by mouth nightly. 90 tablet 2     No current facility-administered medications on file prior to visit.          Vitals:    09/23/24 0941   BP: 136/64   Pulse: 81   Resp: 18   VITALSThere is no height or weight on file to calculate BMI.    Pertinent findings on the physical exam; cor  ireg  no m  lungs  clear        Has  joint line  pain knees   good cap refill -  cor ireg  - no edema  nl  cog     Nya was seen today for follow - up.    Diagnoses and all orders for this visit:    Acute pain of both knees  -     XR KNEE (3 VIEWS), LEFT (CPT=73562); Future  -     XR KNEE (3 VIEWS), RIGHT (CPT=73562); Future    Pulmonary HTN (HCC)    PVD (peripheral vascular disease) (HCC)    Type 2 diabetes mellitus without complication, without long-term current use of insulin (HCC)    Atrial fibrillation, unspecified type (HCC)       Will  xray knees see extent of dz - refer to  ortho      I dont think  knee pain is claudication  -  discussed plavix   for  PA       Keep  off eliquis   risk of fall - keep on metoprolol- off amlodipine     Keep on statin       Has  hx  of  apnea  pulm htn  no sig  sxs       Will nned to  recheck tsh  soon - was  high  but better  - keep on rx     A1c  on high  side  - watch  diet  -  may need meds              This note was prepared using Dragon Medical voice recognition dictation software and as a result, errors may occur. When identified, these errors have been corrected. While every attempt is made to correct errors during dictation, discrepancies may still exist

## 2024-10-01 ENCOUNTER — OFFICE VISIT (OUTPATIENT)
Dept: ORTHOPEDICS CLINIC | Facility: CLINIC | Age: 89
End: 2024-10-01

## 2024-10-01 VITALS — DIASTOLIC BLOOD PRESSURE: 85 MMHG | HEART RATE: 82 BPM | SYSTOLIC BLOOD PRESSURE: 139 MMHG

## 2024-10-01 DIAGNOSIS — M17.12 PRIMARY OSTEOARTHRITIS OF LEFT KNEE: ICD-10-CM

## 2024-10-01 DIAGNOSIS — M25.562 CHRONIC PAIN OF BOTH KNEES: ICD-10-CM

## 2024-10-01 DIAGNOSIS — G89.29 CHRONIC PAIN OF BOTH KNEES: ICD-10-CM

## 2024-10-01 DIAGNOSIS — M25.561 CHRONIC PAIN OF BOTH KNEES: ICD-10-CM

## 2024-10-01 DIAGNOSIS — M17.11 PRIMARY OSTEOARTHRITIS OF RIGHT KNEE: Primary | ICD-10-CM

## 2024-10-01 RX ORDER — TRIAMCINOLONE ACETONIDE 40 MG/ML
40 INJECTION, SUSPENSION INTRA-ARTICULAR; INTRAMUSCULAR ONCE
Status: COMPLETED | OUTPATIENT
Start: 2024-10-01 | End: 2024-10-01

## 2024-10-01 RX ADMIN — TRIAMCINOLONE ACETONIDE 40 MG: 40 INJECTION, SUSPENSION INTRA-ARTICULAR; INTRAMUSCULAR at 17:24:00

## 2024-10-01 NOTE — PROGRESS NOTES
Per verbal order from Dr. Schaefer, draw up 5ml of 0.5% Marcaine and 1ml of Kenalog 40 for cortisone injection to right knee. Shivani JOSEPH MA  Patient provided education handout for cortisone injection.

## 2024-10-01 NOTE — H&P
NURSING INTAKE COMMENTS:   Chief Complaint   Patient presents with    Knee Pain     New pt- here w/ daughter- bilateral knees- onset-yrs ago on and off and pain increased 2 wks ago- rates pain 7/10 on and off- no pain at rest- denies injury - has xray in the system         HPI: This 89 year old female presents today with her daughter with bilateral knee pain right worse than left.  X-rays in our office today show complete bone-on-bone bilaterally with large osteophytes.    She lives in an apartment with an elevator by herself.  Her daughter described it as a senior building.  The patient herself has a sharp mind and a good sense of humor.  She uses a walker normally.  She likes [employer] activities.  She has diabetes but denies neuropathy.  She is on Plavix and aspirin.  She does not smoke.    Past Medical History:    Acute, but ill-defined, cerebrovascular disease    Arthritis    Diabetes (HCC)    Essential hypertension    Hyperlipidemia    Hypothyroidism    Osteoarthritis    Stroke (HCC)     Past Surgical History:   Procedure Laterality Date    Back surgery      Kyphoplasty    Cataract      Colonoscopy            Tonsillectomy       Current Outpatient Medications   Medication Sig Dispense Refill    CLOPIDOGREL 75 MG Oral Tab TAKE 1 TABLET (75 MG TOTAL) BY MOUTH DAILY. 90 tablet 3    PANTOPRAZOLE 40 MG Oral Tab EC TAKE 1 TABLET (40 MG TOTAL) BY MOUTH DAILY. 90 tablet 3    metoprolol tartrate 25 MG Oral Tab Take 1 tablet (25 mg total) by mouth 2 (two) times daily. 180 tablet 0    levothyroxine 100 MCG Oral Tab Take 1 tablet (100 mcg total) by mouth before breakfast.      pravastatin 40 MG Oral Tab Take 1 tablet (40 mg total) by mouth nightly. 90 tablet 2     Not on File  Family History   Problem Relation Age of Onset    Diabetes Brother      No family Hx of DVT/PE    Social History     Occupational History    Not on file   Tobacco Use    Smoking status: Never    Smokeless tobacco: Never   Vaping Use    Vaping  status: Never Used   Substance and Sexual Activity    Alcohol use: Not Currently     Comment: ocssionally    Drug use: Never    Sexual activity: Not on file        Review of Systems:  GENERAL: feels generally well, no significant weight loss or weight gain  SKIN: no ulcerated or worrisome skin lesions  EYES:denies blurred vision or double vision  HEENT: denies new nasal congestion, sinus pain or ST  LUNGS: denies shortness of breath  CARDIOVASCULAR: denies chest pain  GI: no hematemesis, no worsening heartburn, no diarrhea  : no dysuria, no blood in urine, no difficulty urinating, no incontinence  MUSCULOSKELETAL: no other musculoskeletal complaints other than in HPI  NEURO: no numbness or tingling, no weakness or balance disorder  PSYCHE: no depression or anxiety  HEMATOLOGIC: no hx of blood dyscrasia, no Hx DVT/PE  ENDOCRINE: no thyroid or diabetes issues  ALL/ASTHMA: no new hx of severe allergy or asthma    Physical Examination:    There were no vitals taken for this visit.  Constitutional: appears well hydrated, alert and responsive, no acute distress noted  Extremities: The skin under the legs was a little dry but no pitting edema or calf tenderness.  The knees themselves had no effusion or asymmetric warmth.  Musculoskeletal: Motion of the left knee was 0 to 105 degrees in the right knee 5 to 95 degrees.  Both knees had mild diffuse tenderness with the right worse than left.  No instability.  Neurological: Normal motor function bilateral lower extremities.    Imaging: X-rays of both knees show bone-on-bone osteoarthritis in all 3 compartments bilaterally.  Large osteophyte seen as well.      XR KNEE (3 VIEWS) AP LAT OBL BILAT (HIF=66419-51)    Result Date: 9/23/2024  PROCEDURE: XR KNEE (3 VIEWS) AP LAT OBL BILAT EM (CPT=73562)  COMPARISON: None.  INDICATIONS: Chronic bilateral general knee pain. No recent trauma.  TECHNIQUE: 4. views were obtained.   FINDINGS:  BONES: There is diffuse osseous  demineralization, which limits sensitivity for detection of osseous pathology.  No acute fracture or traumatic malalignment.  There is bilateral knee tricompartmental joint narrowing with osteophyte formation.  There is near bone-on-bone articulation within the medial tibiofemoral compartments bilaterally.  There is right knee varus deformity.  There is mild left knee valgus deformity.  Chondrocalcinosis noted bilaterally within the tibiofemoral compartments. SOFT TISSUES: Negative. No visible soft tissue swelling. EFFUSION: No left knee effusion seen.  There is amorphous calcification in the right suprapatellar region. OTHER: Vascular calcifications noted.         CONCLUSION:   Severe tricompartmental knee osteoarthritis bilaterally.  Demineralization.  Calcifications in the right suprapatellar region, which may represent secondary synovial chondromatosis in the setting of advanced degenerative change.  However, further evaluation can be made with MRI of the knee if clinically relevant.    Dictated by (CST): Jillian Conley MD on 9/23/2024 at 3:18 PM     Finalized by (CST): Jillian Conley MD on 9/23/2024 at 3:21 PM             Lab Results   Component Value Date    WBC 7.6 04/09/2024    HGB 13.2 04/09/2024    .0 04/09/2024      Lab Results   Component Value Date     (H) 04/09/2024    BUN 33 (H) 04/09/2024    CREATSERUM 1.62 (H) 04/09/2024        Assessment and Plan:  Diagnoses and all orders for this visit:    Primary osteoarthritis of right knee  -     Arthrocentesis aspiration and injection major Right joint bursa w/o US  -     triamcinolone acetonide (Kenalog-40) 40 MG/ML injection 40 mg    Primary osteoarthritis of left knee    Chronic pain of both knees  -     Arthrocentesis aspiration and injection major Right joint bursa w/o US  -     triamcinolone acetonide (Kenalog-40) 40 MG/ML injection 40 mg        Assessment: End-stage osteoarthritis with bone-on-bone in a 89-year-old woman.    Plan: We  discussed the possibility of knee replacements despite her age.  Recall that she does live independently.  Her mind is sharp.  For now however she wanted to try cortisone injections.  Because of her diabetes I told her we should only do 1 knee per day.  She chose the right knee today.  Aspiration of the right knee yielded 7 cc normal clear yellow joint fluid.  It was shown to her and her daughter and discarded.  She tolerated the injection bupivacaine 0.5% 5 cc and Kenalog 1 cc well to the right knee.    She can have these injections every 4 months at a maximum.  She wants to the left knee injected she should wait at least 3 weeks.  We also talked about hyaluronic acid injections.  For now I will see her as needed.    Follow Up: No follow-ups on file.    Bruce Schaefer MD

## 2024-10-02 RX ORDER — LEVOTHYROXINE SODIUM 100 UG/1
100 TABLET ORAL
Qty: 90 TABLET | Refills: 3 | Status: SHIPPED | OUTPATIENT
Start: 2024-10-02

## 2024-10-22 ENCOUNTER — OFFICE VISIT (OUTPATIENT)
Dept: ORTHOPEDICS CLINIC | Facility: CLINIC | Age: 89
End: 2024-10-22

## 2024-10-22 VITALS — DIASTOLIC BLOOD PRESSURE: 93 MMHG | HEART RATE: 83 BPM | SYSTOLIC BLOOD PRESSURE: 167 MMHG

## 2024-10-22 DIAGNOSIS — M17.12 PRIMARY OSTEOARTHRITIS OF LEFT KNEE: Primary | ICD-10-CM

## 2024-10-22 PROCEDURE — 3077F SYST BP >= 140 MM HG: CPT

## 2024-10-22 PROCEDURE — 1160F RVW MEDS BY RX/DR IN RCRD: CPT

## 2024-10-22 PROCEDURE — 20610 DRAIN/INJ JOINT/BURSA W/O US: CPT

## 2024-10-22 PROCEDURE — 99203 OFFICE O/P NEW LOW 30 MIN: CPT

## 2024-10-22 PROCEDURE — 1159F MED LIST DOCD IN RCRD: CPT

## 2024-10-22 PROCEDURE — 3080F DIAST BP >= 90 MM HG: CPT

## 2024-10-22 RX ORDER — TRIAMCINOLONE ACETONIDE 40 MG/ML
40 INJECTION, SUSPENSION INTRA-ARTICULAR; INTRAMUSCULAR ONCE
Status: COMPLETED | OUTPATIENT
Start: 2024-10-22 | End: 2024-10-22

## 2024-10-22 RX ADMIN — TRIAMCINOLONE ACETONIDE 40 MG: 40 INJECTION, SUSPENSION INTRA-ARTICULAR; INTRAMUSCULAR at 15:16:00

## 2024-10-22 NOTE — PROGRESS NOTES
NURSING INTAKE COMMENTS:   Chief Complaint   Patient presents with    Injection     Pt here for left knee cortisone injection. Denies any pain today.       HPI: This 89 year old female presents today with her daughter for follow-up of left knee pain.  She was seen in our office previously and received a right knee cortisone injection.  She reports that that has so far helped her pain.  Her biggest complaint today is that she can now hear her right knee arthritis.  Her left knee pain is present, but not as bad as the right knee was.  She is taking Plavix for anticoagulation.  She has diabetes.    Past Medical History:    Acute, but ill-defined, cerebrovascular disease    Arthritis    Diabetes (HCC)    Essential hypertension    Hyperlipidemia    Hypothyroidism    Osteoarthritis    Stroke (HCC)     Past Surgical History:   Procedure Laterality Date    Back surgery      Kyphoplasty    Cataract      Colonoscopy            Tonsillectomy       Current Outpatient Medications   Medication Sig Dispense Refill    levothyroxine 100 MCG Oral Tab Take 1 tablet (100 mcg total) by mouth before breakfast. 90 tablet 3    CLOPIDOGREL 75 MG Oral Tab TAKE 1 TABLET (75 MG TOTAL) BY MOUTH DAILY. 90 tablet 3    PANTOPRAZOLE 40 MG Oral Tab EC TAKE 1 TABLET (40 MG TOTAL) BY MOUTH DAILY. 90 tablet 3    metoprolol tartrate 25 MG Oral Tab Take 1 tablet (25 mg total) by mouth 2 (two) times daily. 180 tablet 0    pravastatin 40 MG Oral Tab Take 1 tablet (40 mg total) by mouth nightly. 90 tablet 2     Allergies[1]  Family History   Problem Relation Age of Onset    Diabetes Brother      No family Hx of DVT/PE    Social History     Occupational History    Not on file   Tobacco Use    Smoking status: Never    Smokeless tobacco: Never   Vaping Use    Vaping status: Never Used   Substance and Sexual Activity    Alcohol use: Not Currently     Comment: ocssionally    Drug use: Never    Sexual activity: Not on file        Review of Systems:  GENERAL:  feels generally well, no significant weight loss or weight gain  SKIN: no ulcerated or worrisome skin lesions  EYES:denies blurred vision or double vision  HEENT: denies new nasal congestion, sinus pain or ST  LUNGS: denies shortness of breath  CARDIOVASCULAR: denies chest pain  GI: no hematemesis, no worsening heartburn, no diarrhea  : no dysuria, no blood in urine, no difficulty urinating, no incontinence  MUSCULOSKELETAL: no other musculoskeletal complaints other than in HPI  NEURO: no numbness or tingling, no weakness or balance disorder  PSYCHE: no depression or anxiety  HEMATOLOGIC: no hx of blood dyscrasia, no Hx DVT/PE  ENDOCRINE: no thyroid or diabetes issues  ALL/ASTHMA: no new hx of severe allergy or asthma    Physical Examination:    BP (!) 167/93   Pulse 83   Constitutional: appears well hydrated, alert and responsive, no acute distress noted  Extremities: Lower extremity skin healthy, no pitting edema.  Calf soft nontender.  Musculoskeletal: Left knee range of motion 0 to 120 degrees, no instability.  Patellofemoral grind positive for crepitus but no pain.  Tenderness still medial joint line, minimal tenderness to lateral joint line.  Neurological: Motor and sensory function intact.    Imaging: None taken today.      XR KNEE (3 VIEWS) AP LAT OBL BILAT (USM=93519-72)    Result Date: 9/23/2024  PROCEDURE: XR KNEE (3 VIEWS) AP LAT OBL BILAT EM (CPT=73562)  COMPARISON: None.  INDICATIONS: Chronic bilateral general knee pain. No recent trauma.  TECHNIQUE: 4. views were obtained.   FINDINGS:  BONES: There is diffuse osseous demineralization, which limits sensitivity for detection of osseous pathology.  No acute fracture or traumatic malalignment.  There is bilateral knee tricompartmental joint narrowing with osteophyte formation.  There is near bone-on-bone articulation within the medial tibiofemoral compartments bilaterally.  There is right knee varus deformity.  There is mild left knee valgus deformity.   Chondrocalcinosis noted bilaterally within the tibiofemoral compartments. SOFT TISSUES: Negative. No visible soft tissue swelling. EFFUSION: No left knee effusion seen.  There is amorphous calcification in the right suprapatellar region. OTHER: Vascular calcifications noted.         CONCLUSION:   Severe tricompartmental knee osteoarthritis bilaterally.  Demineralization.  Calcifications in the right suprapatellar region, which may represent secondary synovial chondromatosis in the setting of advanced degenerative change.  However, further evaluation can be made with MRI of the knee if clinically relevant.    Dictated by (CST): Jillian Conley MD on 9/23/2024 at 3:18 PM     Finalized by (CST): Jillian Conley MD on 9/23/2024 at 3:21 PM             Lab Results   Component Value Date    WBC 7.6 04/09/2024    HGB 13.2 04/09/2024    .0 04/09/2024      Lab Results   Component Value Date     (H) 04/09/2024    BUN 33 (H) 04/09/2024    CREATSERUM 1.62 (H) 04/09/2024        Assessment and Plan:  Diagnoses and all orders for this visit:    Primary osteoarthritis of left knee  -     Large joint - left aspiration/injection  -     triamcinolone acetonide (Kenalog-40) 40 MG/ML injection 40 mg        Assessment: As above    Plan: Patient wanted to proceed with left knee cortisone injection today.  Aspiration was negative.  5 cc 0.5% bupivacaine, 1 cc Kenalog injected.  Patient tolerated injection well.  Discussed with her if she has any irritation she could use Tylenol as needed as well as ice with a washcloth.    I also discussed hyaluronic gel injections with both the patient and the patient's daughter.  They will call if they want to receive prior authorization for the gel injections.  For now we will see them as needed at this time.    Follow Up: No follow-ups on file.    PORTER Mccurdy       [1] Not on File

## 2024-10-22 NOTE — PROGRESS NOTES
Per verbal order from Dr. Schaefer, draw up 5ml of 0.5% Marcaine and 1ml of Kenalog 40 for cortisone injection to left  knee. Deirdre KRISHNAN MA  Patient provided education handout for cortisone injection.

## 2024-12-02 RX ORDER — PRAVASTATIN SODIUM 40 MG
40 TABLET ORAL NIGHTLY
Qty: 90 TABLET | Refills: 0 | Status: SHIPPED | OUTPATIENT
Start: 2024-12-02 | End: 2025-02-14

## 2024-12-05 RX ORDER — LEVOTHYROXINE SODIUM 100 UG/1
100 TABLET ORAL
Qty: 90 TABLET | Refills: 0 | Status: SHIPPED | OUTPATIENT
Start: 2024-12-05

## 2025-01-14 ENCOUNTER — OFFICE VISIT (OUTPATIENT)
Dept: ORTHOPEDICS CLINIC | Facility: CLINIC | Age: OVER 89
End: 2025-01-14
Payer: MEDICARE

## 2025-01-14 DIAGNOSIS — M25.561 CHRONIC PAIN OF BOTH KNEES: ICD-10-CM

## 2025-01-14 DIAGNOSIS — M25.562 CHRONIC PAIN OF BOTH KNEES: ICD-10-CM

## 2025-01-14 DIAGNOSIS — M17.11 PRIMARY OSTEOARTHRITIS OF RIGHT KNEE: ICD-10-CM

## 2025-01-14 DIAGNOSIS — G89.29 CHRONIC PAIN OF BOTH KNEES: ICD-10-CM

## 2025-01-14 DIAGNOSIS — M17.12 PRIMARY OSTEOARTHRITIS OF LEFT KNEE: Primary | ICD-10-CM

## 2025-01-14 PROCEDURE — 1159F MED LIST DOCD IN RCRD: CPT

## 2025-01-14 PROCEDURE — 99213 OFFICE O/P EST LOW 20 MIN: CPT

## 2025-01-14 PROCEDURE — 1160F RVW MEDS BY RX/DR IN RCRD: CPT

## 2025-01-14 NOTE — PROGRESS NOTES
NURSING INTAKE COMMENTS:   Chief Complaint   Patient presents with    Knee Pain     B/l knee - Pt states knee pain when walking. Had cortisone inejcections in 2024.        HPI: This 89 year old female presents today with her daughter for follow-up of bilateral knee pain.  Patient had bilateral cortisone injections in October, reported that it gave her approximately 2 months of pain.  She was here to hopefully receive another cortisone injection today.  Unfortunately, patient was too soon for cortisone injection of either knee.  We discussed options moving forward including both gel injections and cortisone injections.    Past Medical History:    Acute, but ill-defined, cerebrovascular disease    Arthritis    Diabetes (HCC)    Essential hypertension    Hyperlipidemia    Hypothyroidism    Osteoarthritis    Stroke (HCC)     Past Surgical History:   Procedure Laterality Date    Back surgery      Kyphoplasty    Cataract      Colonoscopy            Tonsillectomy       Current Outpatient Medications   Medication Sig Dispense Refill    levothyroxine 100 MCG Oral Tab Take 1 tablet (100 mcg total) by mouth before breakfast. 90 tablet 0    pravastatin 40 MG Oral Tab Take 1 tablet (40 mg total) by mouth nightly. 90 tablet 0    CLOPIDOGREL 75 MG Oral Tab TAKE 1 TABLET (75 MG TOTAL) BY MOUTH DAILY. 90 tablet 3    PANTOPRAZOLE 40 MG Oral Tab EC TAKE 1 TABLET (40 MG TOTAL) BY MOUTH DAILY. 90 tablet 3    metoprolol tartrate 25 MG Oral Tab Take 1 tablet (25 mg total) by mouth 2 (two) times daily. 180 tablet 0     Allergies[1]  Family History   Problem Relation Age of Onset    Diabetes Brother      No family Hx of DVT/PE    Social History     Occupational History    Not on file   Tobacco Use    Smoking status: Never    Smokeless tobacco: Never   Vaping Use    Vaping status: Never Used   Substance and Sexual Activity    Alcohol use: Not Currently     Comment: ocssionally    Drug use: Never    Sexual activity: Not on file         Review of Systems:  GENERAL: feels generally well, no significant weight loss or weight gain  SKIN: no ulcerated or worrisome skin lesions  EYES:denies blurred vision or double vision  HEENT: denies new nasal congestion, sinus pain or ST  LUNGS: denies shortness of breath  CARDIOVASCULAR: denies chest pain  GI: no hematemesis, no worsening heartburn, no diarrhea  : no dysuria, no blood in urine, no difficulty urinating, no incontinence  MUSCULOSKELETAL: no other musculoskeletal complaints other than in HPI  NEURO: no numbness or tingling, no weakness or balance disorder  PSYCHE: no depression or anxiety  HEMATOLOGIC: no hx of blood dyscrasia, no Hx DVT/PE  ENDOCRINE: no thyroid or diabetes issues  ALL/ASTHMA: no new hx of severe allergy or asthma    Physical Examination:    There were no vitals taken for this visit.  Constitutional: appears well hydrated, alert and responsive, no acute distress noted  Extremities: Lower extremity skin healthy, no pitting edema.  Calf soft nontender.  Musculoskeletal: Bilateral knee range of motion 0 to 120 degrees, no instability.  Tenderness to bilateral medial joint lines.  Able to straight leg raise against resistance.  Able to dorsiflex and plantarflex against resistance.  Neurological: Motor and sensory function intact.    Imaging: None taken today.      No results found.     Lab Results   Component Value Date    WBC 7.6 04/09/2024    HGB 13.2 04/09/2024    .0 04/09/2024      Lab Results   Component Value Date     (H) 04/09/2024    BUN 33 (H) 04/09/2024    CREATSERUM 1.62 (H) 04/09/2024        Assessment and Plan:  Diagnoses and all orders for this visit:    Primary osteoarthritis of left knee  -     [facility] PROC FOR MANAGED CARE AUTH    Primary osteoarthritis of right knee  -     [facility]    Chronic pain of both knees  -     [facility] PROC FOR [facility] CARE AUTH        Assessment: As above    Plan: Again, discussed  different types of injections with the patient including cortisone and gel injections.  Unfortunately, she is too soon for cortisone injection today.  We will put in a prior authorization for bilateral knees for hyaluronic gel injections.  She will follow-up once they have been approved.    Follow Up: No follow-ups on file.    PORTER Mccurdy       [1] Not on File

## 2025-01-17 ENCOUNTER — TELEPHONE (OUTPATIENT)
Dept: ORTHOPEDICS CLINIC | Facility: CLINIC | Age: OVER 89
End: 2025-01-17

## 2025-01-22 NOTE — TELEPHONE ENCOUNTER
Spoke to patient and informed. Per her request she would like us to contact her daughter Abbie to schedule appointment as she takes her to appointment.     Left message to call back for patients daughter. Phone Room- please assist with scheduling patient for Durolane Injection. Thank you.

## 2025-01-24 ENCOUNTER — TELEPHONE (OUTPATIENT)
Dept: ORTHOPEDICS CLINIC | Facility: CLINIC | Age: OVER 89
End: 2025-01-24

## 2025-01-24 NOTE — TELEPHONE ENCOUNTER
S/w daughter (on [employer]IP[employer])- Booked patient for 1/28/25 appointment with Katarzyna Sadler PA-C on 1/28/25 at 1:30. She accepted.

## 2025-01-24 NOTE — TELEPHONE ENCOUNTER
Per patient's daughter calling asking if it was possible to be schedule with PORTER Colón instead of Dr. Schaefer for her next knee injection due to PORTER Colón having sooner availability. Per patient reports a lot of discomfort and is asking for a sooner injection. Please advise.

## 2025-02-05 RX ORDER — METOPROLOL TARTRATE 25 MG/1
25 TABLET, FILM COATED ORAL 2 TIMES DAILY
Qty: 180 TABLET | Refills: 3 | Status: SHIPPED | OUTPATIENT
Start: 2025-02-05

## 2025-02-07 ENCOUNTER — NURSE TRIAGE (OUTPATIENT)
Dept: INTERNAL MEDICINE CLINIC | Facility: CLINIC | Age: OVER 89
End: 2025-02-07

## 2025-02-07 ENCOUNTER — HOSPITAL ENCOUNTER (EMERGENCY)
Facility: HOSPITAL | Age: OVER 89
Discharge: HOME | End: 2025-02-07
Attending: EMERGENCY MEDICINE
Payer: MEDICARE

## 2025-02-07 ENCOUNTER — TELEPHONE (OUTPATIENT)
Age: OVER 89
End: 2025-02-07

## 2025-02-07 ENCOUNTER — HOSPITAL ENCOUNTER (OUTPATIENT)
Age: OVER 89
Discharge: ANOTHER HOSPITAL | End: 2025-02-07
Payer: MEDICARE

## 2025-02-07 ENCOUNTER — PATIENT MESSAGE (OUTPATIENT)
Dept: INTERNAL MEDICINE CLINIC | Facility: CLINIC | Age: OVER 89
End: 2025-02-07

## 2025-02-07 VITALS
DIASTOLIC BLOOD PRESSURE: 73 MMHG | OXYGEN SATURATION: 100 % | TEMPERATURE: 98 F | HEART RATE: 85 BPM | SYSTOLIC BLOOD PRESSURE: 132 MMHG | RESPIRATION RATE: 20 BRPM

## 2025-02-07 VITALS
TEMPERATURE: 98 F | SYSTOLIC BLOOD PRESSURE: 174 MMHG | RESPIRATION RATE: 18 BRPM | WEIGHT: 145 LBS | OXYGEN SATURATION: 97 % | DIASTOLIC BLOOD PRESSURE: 95 MMHG | HEART RATE: 82 BPM

## 2025-02-07 DIAGNOSIS — N39.0 URINARY TRACT INFECTION WITHOUT HEMATURIA, SITE UNSPECIFIED: Primary | ICD-10-CM

## 2025-02-07 DIAGNOSIS — R73.9 HYPERGLYCEMIA: ICD-10-CM

## 2025-02-07 DIAGNOSIS — R53.1 WEAKNESS: ICD-10-CM

## 2025-02-07 DIAGNOSIS — N30.00 ACUTE CYSTITIS WITHOUT HEMATURIA: Primary | ICD-10-CM

## 2025-02-07 DIAGNOSIS — N28.9 RENAL INSUFFICIENCY: ICD-10-CM

## 2025-02-07 LAB
ANION GAP SERPL CALC-SCNC: 6 MMOL/L (ref 0–18)
BASOPHILS # BLD AUTO: 0.05 X10(3) UL (ref 0–0.2)
BASOPHILS NFR BLD AUTO: 0.3 %
BILIRUB UR QL STRIP: NEGATIVE
BUN BLD-MCNC: 52 MG/DL (ref 9–23)
BUN/CREAT SERPL: 33.3 (ref 10–20)
CALCIUM BLD-MCNC: 10.1 MG/DL (ref 8.7–10.4)
CHLORIDE SERPL-SCNC: 100 MMOL/L (ref 98–112)
CO2 SERPL-SCNC: 26 MMOL/L (ref 21–32)
COLOR UR: YELLOW
CREAT BLD-MCNC: 1.56 MG/DL
DEPRECATED RDW RBC AUTO: 44.3 FL (ref 35.1–46.3)
EGFRCR SERPLBLD CKD-EPI 2021: 31 ML/MIN/1.73M2 (ref 60–?)
EOSINOPHIL # BLD AUTO: 0.2 X10(3) UL (ref 0–0.7)
EOSINOPHIL NFR BLD AUTO: 1.3 %
ERYTHROCYTE [DISTWIDTH] IN BLOOD BY AUTOMATED COUNT: 12.8 % (ref 11–15)
GLUCOSE BLD-MCNC: 275 MG/DL (ref 70–99)
GLUCOSE BLDC GLUCOMTR-MCNC: 238 MG/DL (ref 70–99)
GLUCOSE BLDC GLUCOMTR-MCNC: 286 MG/DL (ref 70–99)
GLUCOSE BLDC GLUCOMTR-MCNC: 292 MG/DL (ref 70–99)
GLUCOSE UR STRIP-MCNC: 250 MG/DL
HCT VFR BLD AUTO: 46.3 %
HGB BLD-MCNC: 15.7 G/DL
IMM GRANULOCYTES # BLD AUTO: 0.24 X10(3) UL (ref 0–1)
IMM GRANULOCYTES NFR BLD: 1.6 %
KETONES UR STRIP-MCNC: NEGATIVE MG/DL
LYMPHOCYTES # BLD AUTO: 2.17 X10(3) UL (ref 1–4)
LYMPHOCYTES NFR BLD AUTO: 14.6 %
MCH RBC QN AUTO: 31.7 PG (ref 26–34)
MCHC RBC AUTO-ENTMCNC: 33.9 G/DL (ref 31–37)
MCV RBC AUTO: 93.5 FL
MONOCYTES # BLD AUTO: 1.09 X10(3) UL (ref 0.1–1)
MONOCYTES NFR BLD AUTO: 7.3 %
NEUTROPHILS # BLD AUTO: 11.08 X10 (3) UL (ref 1.5–7.7)
NEUTROPHILS # BLD AUTO: 11.08 X10(3) UL (ref 1.5–7.7)
NEUTROPHILS NFR BLD AUTO: 74.9 %
NITRITE UR QL STRIP: POSITIVE
OSMOLALITY SERPL CALC.SUM OF ELEC: 298 MOSM/KG (ref 275–295)
PH UR STRIP: 5.5 [PH]
PLATELET # BLD AUTO: 161 10(3)UL (ref 150–450)
PLATELETS.RETICULATED NFR BLD AUTO: 20.4 % (ref 0–7)
POTASSIUM SERPL-SCNC: 5.2 MMOL/L (ref 3.5–5.1)
PROT UR STRIP-MCNC: 100 MG/DL
RBC # BLD AUTO: 4.95 X10(6)UL
SODIUM SERPL-SCNC: 132 MMOL/L (ref 136–145)
SP GR UR STRIP: 1.02
UROBILINOGEN UR STRIP-ACNC: <2 MG/DL
WBC # BLD AUTO: 14.8 X10(3) UL (ref 4–11)

## 2025-02-07 PROCEDURE — 81002 URINALYSIS NONAUTO W/O SCOPE: CPT

## 2025-02-07 PROCEDURE — 99214 OFFICE O/P EST MOD 30 MIN: CPT

## 2025-02-07 PROCEDURE — 87086 URINE CULTURE/COLONY COUNT: CPT | Performed by: PHYSICIAN ASSISTANT

## 2025-02-07 PROCEDURE — 87186 SC STD MICRODIL/AGAR DIL: CPT | Performed by: PHYSICIAN ASSISTANT

## 2025-02-07 PROCEDURE — 85025 COMPLETE CBC W/AUTO DIFF WBC: CPT

## 2025-02-07 PROCEDURE — 99284 EMERGENCY DEPT VISIT MOD MDM: CPT

## 2025-02-07 PROCEDURE — 93005 ELECTROCARDIOGRAM TRACING: CPT

## 2025-02-07 PROCEDURE — 82962 GLUCOSE BLOOD TEST: CPT

## 2025-02-07 PROCEDURE — 85025 COMPLETE CBC W/AUTO DIFF WBC: CPT | Performed by: EMERGENCY MEDICINE

## 2025-02-07 PROCEDURE — 93010 ELECTROCARDIOGRAM REPORT: CPT

## 2025-02-07 PROCEDURE — 80048 BASIC METABOLIC PNL TOTAL CA: CPT | Performed by: EMERGENCY MEDICINE

## 2025-02-07 PROCEDURE — 87077 CULTURE AEROBIC IDENTIFY: CPT | Performed by: PHYSICIAN ASSISTANT

## 2025-02-07 PROCEDURE — 80048 BASIC METABOLIC PNL TOTAL CA: CPT

## 2025-02-07 PROCEDURE — 96361 HYDRATE IV INFUSION ADD-ON: CPT

## 2025-02-07 PROCEDURE — 96365 THER/PROPH/DIAG IV INF INIT: CPT

## 2025-02-07 RX ORDER — SAXAGLIPTIN 5 MG/1
5 TABLET, FILM COATED ORAL DAILY
Qty: 30 TABLET | Refills: 0 | Status: SHIPPED | OUTPATIENT
Start: 2025-02-07 | End: 2025-02-11

## 2025-02-07 RX ORDER — CEPHALEXIN 500 MG/1
500 CAPSULE ORAL 3 TIMES DAILY
Qty: 15 CAPSULE | Refills: 0 | Status: SHIPPED | OUTPATIENT
Start: 2025-02-07 | End: 2025-02-12

## 2025-02-07 NOTE — ED INITIAL ASSESSMENT (HPI)
Per daughter, pt lives in an independent home, c/o increased thirst and urinary incontinence with malodorous urine for 2-3 days, generalized weakness with diff ambulating , no fever, no cp, no sob

## 2025-02-07 NOTE — TELEPHONE ENCOUNTER
Spoke with Nya who is alert and responsive. Patient denied severe thirst this AM and daughter Purnima reported she has mildly dry mucous membranes., She denies need to void this AM. Patient is able to stand with her walker with is her baseline. Patient denied dizziness , lightheadedness, nausea, or vomiting.  Patient is unable to check her blood sugar due to no glucometer.       Disposition: Go to  today.     RN informed Abbie Britton, and Purnima if she develops confusion, severe thirst, dizziness,or inability to stand to take her to the emergency department. Patient and daughters voiced understanding.      Reason for Disposition   MODERATE weakness or fatigue from poor fluid intake with no improvement after 2 hours of rest and fluids    Answer Assessment - Initial Assessment Questions  1. DESCRIPTION: \"Describe how you are feeling.\"      OK  2. SEVERITY: \"How bad is it?\"  \"Can you stand and walk?\"    - MILD (0-3): Feels weak or tired, but does not interfere with work, school or normal activities.    - MODERATE (4-7): Able to stand and walk; weakness interferes with work, school, or normal activities.    - SEVERE (8-10): Unable to stand or walk; unable to do usual activities.      Moderate   3. ONSET: \"When did these symptoms begin?\" (e.g., hours, days, weeks, months)      Yesterday  4. CAUSE: \"What do you think is causing the weakness or fatigue?\" (e.g., not drinking enough fluids, medical problem, trouble sleeping)      Unknown  5. NEW MEDICINES:  \"Have you started on any new medicines recently?\" (e.g., opioid pain medicines, benzodiazepines, muscle relaxants, antidepressants, antihistamines, neuroleptics, beta blockers)      Denied  6. OTHER SYMPTOMS: \"Do you have any other symptoms?\" (e.g., chest pain, fever, cough, SOB, vomiting, diarrhea, bleeding, other areas of pain)      Increased urination, thirst  7. PREGNANCY: \"Is there any chance you are pregnant?\" \"When was your last menstrual period?\"       N/A    Protocols used: Weakness (Generalized) and Fatigue-A-OH

## 2025-02-07 NOTE — ED PROVIDER NOTES
Patient Seen in: [facility]      History     Chief Complaint   Patient presents with    Urinary Symptoms     Increased thirst/fatigue/weakness for 2 days. PCP recommended IC for possible elevated blood sugar evaluation/UTI ?? - Entered by patient     Stated Complaint: Urinary Symptoms - Increased thirst/fatigue/weakness for 2 days. PCP recommende*    Subjective:   HPI    89-year-old female with past medical history of hypertension, diabetes presents to the immediate care with her daughter for evaluation of increased thirst, increased incontinence episode and generalized weakness.  Daughter states she noted the symptoms 2 to 3 days ago.  Patient is moving much slower than normal and is complaining of frequent thirst.  She denies fevers, abdominal pain.  Reports good appetite.    Objective:     Past Medical History:    Acute, but ill-defined, cerebrovascular disease    Arthritis    Diabetes (HCC)    Essential hypertension    Hyperlipidemia    Hypothyroidism    Osteoarthritis    Stroke (HCC)              Past Surgical History:   Procedure Laterality Date    Back surgery      Kyphoplasty    Cataract      Colonoscopy            Tonsillectomy                  Social History     Socioeconomic History    Marital status:    Tobacco Use    Smoking status: Never    Smokeless tobacco: Never   Vaping Use    Vaping status: Never Used   Substance and Sexual Activity    Alcohol use: Not Currently     Comment: ocssionally    Drug use: Never   Other Topics Concern    Caffeine Concern No    Exercise No    Seat Belt No    Special Diet No    Stress Concern No    Weight Concern No     Social Drivers of Health      [facility], [facility]    Housing Stability              Review of Systems    Positive for stated complaint: Urinary Symptoms - Increased thirst/fatigue/weakness for 2 days. PCP recommende*  Other systems are as noted in HPI.  Constitutional and  vital signs reviewed.      All other systems reviewed and negative except as noted above.    Physical Exam     ED Triage Vitals [02/07/25 1312]   /73   Pulse 85   Resp 20   Temp 97.7 °F (36.5 °C)   Temp src Oral   SpO2 100 %   O2 Device None (Room air)       Current Vitals:   Vital Signs  BP: 132/73  Pulse: 85  Resp: 20  Temp: 97.7 °F (36.5 °C)  Temp src: Oral    Oxygen Therapy  SpO2: 100 %  O2 Device: None (Room air)        Physical Exam  Vitals and nursing note reviewed.   Constitutional:       General: She is not in acute distress.  HENT:      Head: Normocephalic and atraumatic.      Right Ear: External ear normal.      Left Ear: External ear normal.      Nose: Nose normal.      Mouth/Throat:      Mouth: Mucous membranes are moist.   Eyes:      Extraocular Movements: Extraocular movements intact.      Pupils: Pupils are equal, round, and reactive to light.   Cardiovascular:      Rate and Rhythm: Normal rate.   Pulmonary:      Effort: Pulmonary effort is normal.   Abdominal:      General: Abdomen is flat. There is no distension.      Tenderness: There is no abdominal tenderness.   Musculoskeletal:         General: Normal range of motion.      Cervical back: Normal range of motion.   Skin:     General: Skin is warm.   Neurological:      General: No focal deficit present.      Mental Status: She is alert and oriented to person, place, and time.   Psychiatric:         Mood and Affect: Mood normal.         Behavior: Behavior normal.             ED Course     Labs Reviewed   POCT GLUCOSE - Abnormal; Notable for the following components:       Result Value    POC Glucose  286 (*)     All other components within normal limits   [facility] POCT URINALYSIS DIPSTICK - Abnormal; Notable for the following components:    Urine Clarity Cloudy (*)     Protein urine 100 (*)     Glucose, Urine 250 (*)     Blood, Urine Moderate (*)     Nitrite Urine Positive (*)     Leukocyte esterase urine Small (*)     All other components within  normal limits   URINE CULTURE, ROUTINE        89-year-old female presents to the immediate care for evaluation of generalized weakness, fatigue, increased thirst and more frequent episodes of incontinence occurring overnight  Ddx-hyperglycemia, DKA, UTI, urosepsis    UA cloudy with moderate blood, small leukocytes.  Nitrite positive.  POC glucose was 286  Patient noted to be extremely weak requiring much assistance with transfers.  I discussed the concern for urosepsis vs nephritis.  Recommend further observation/potential admission in the ER.  Daughter is agreeable and will take the patient to [facility].         MDM              Medical Decision Making      Disposition and Plan     Clinical Impression:  1. Acute cystitis without hematuria    2. Weakness         Disposition:  Ic to ed  2/7/2025  2:33 pm    Follow-up:  Ruben Sweeney  [address] [address]  [phone]                Medications Prescribed:  Discharge Medication List as of 2/7/2025  2:46 PM              Supplementary Documentation:

## 2025-02-07 NOTE — TELEPHONE ENCOUNTER
Daughter called stating that pt has increase symptoms of severe thirst, urination, seems very tired and weak  Daughter stated that she is concerned since this is not her usual at all, that regularly she has to follow pt around so she can drink water but this time she is just way too thirsty and is so weak that she can barely get herself in bed    Daughter is requesting a appt to see pcp today or if he can recommend something, does she have to take pt to ER?    Please advise

## 2025-02-08 LAB
Q-T INTERVAL: 360 MS
QRS DURATION: 98 MS
QTC CALCULATION (BEZET): 405 MS
R AXIS: 148 DEGREES
T AXIS: -25 DEGREES
VENTRICULAR RATE: 76 BPM

## 2025-02-08 NOTE — ED PROVIDER NOTES
Patient Seen in: [facility] Emergency Department    History     Chief Complaint   Patient presents with    Urinary Symptoms    Weakness     Stated Complaint: Urinary Symptoms; Weakness    HPI    Patient complains of urinary frequency along with polydipsia.  She has had history of borderline diabetes she she was seen at immediate care diagnosed with UTI and hyperglycemia sent to the ER due to blood sugar being greater than 250.  She is not currently on any medication for diabetes.  Generalized weakness noticed.  No fever no vomiting no flank pain..    Alleviating factors: none  Exacerbating factors: none    Past Medical History:    Acute, but ill-defined, cerebrovascular disease    Arthritis    Diabetes (HCC)    Essential hypertension    Hyperlipidemia    Hypothyroidism    Osteoarthritis    Stroke (HCC)       Past Surgical History:   Procedure Laterality Date    Back surgery      Kyphoplasty    Cataract      Colonoscopy            Tonsillectomy              Family History   Problem Relation Age of Onset    Diabetes Brother        Social History     Socioeconomic History    Marital status:    Tobacco Use    Smoking status: Never    Smokeless tobacco: Never   Vaping Use    Vaping status: Never Used   Substance and Sexual Activity    Alcohol use: Not Currently     Comment: ocssionally    Drug use: Never   Other Topics Concern    Caffeine Concern No    Exercise No    Seat Belt No    Special Diet No    Stress Concern No    Weight Concern No     Social Drivers of Health      [facility] from [facility], [facility]    Housing Stability       Review of Systems    Positive for stated complaint: Urinary Symptoms; Weakness  Other systems are as noted in HPI.  Constitutional and vital signs reviewed.      All other systems reviewed and negative except as noted above.    PSFH elements reviewed from today and agreed except as otherwise stated in HPI.    Physical Exam     ED  Triage Vitals [02/07/25 1532]   BP (!) 171/90   Pulse 84   Resp 18   Temp 97.5 °F (36.4 °C)   Temp src Oral   SpO2 99 %   O2 Device None (Room air)       Current:BP (!) 174/95   Pulse 82   Temp 97.5 °F (36.4 °C) (Oral)   Resp 18   Wt 65.8 kg   SpO2 97%    PULSE OX nl  GENERAL: Appears tired nontoxic  HEAD: normocephalic, atraumatic,   EYES: PERRLA, EOMI, conj sclera clear  THROAT: mmm, no lesions  NECK: supple, no meningeal signs  LUNGS: no resp distress, cta bilateral  CARDIO: RRR without murmur  GI: abdomen is soft and non tender, no masses, nl bowel sounds   EXTREMITIES: from, 5/5 strength in all 4 ext, no edema  NEURO: alert and oiented *3, 2-12 intact, no focal deficit noted  SKIN: good skin turgor, no  rashes  PSYCH: calm, cooperative,    Differential includes: UTI with hyperglycemia    ED Course     Labs Reviewed   CBC WITH DIFFERENTIAL WITH PLATELET - Abnormal; Notable for the following components:       Result Value    WBC 14.8 (*)     Immature Platelet Fraction 20.4 (*)     Neutrophil Absolute Prelim 11.08 (*)     Neutrophil Absolute 11.08 (*)     Monocyte Absolute 1.09 (*)     All other components within normal limits   BASIC METABOLIC PANEL (8) - Abnormal; Notable for the following components:    Glucose 275 (*)     Sodium 132 (*)     Potassium 5.2 (*)     BUN 52 (*)     Creatinine 1.56 (*)     BUN/CREA Ratio 33.3 (*)     Calculated Osmolality 298 (*)     eGFR-Cr 31 (*)     All other components within normal limits   POCT GLUCOSE - Abnormal; Notable for the following components:    POC Glucose  292 (*)     All other components within normal limits   POCT GLUCOSE - Abnormal; Notable for the following components:    POC Glucose  238 (*)     All other components within normal limits   RAINBOW DRAW LAVENDER   RAINBOW DRAW LIGHT GREEN   RAINBOW DRAW BLUE     EKG    Rate, intervals and axes as noted on EKG Report.  Rate: 76  Rhythm: Atrial Fibrillation  Reading: Atrial for with incomplete right bundle  branch block and nonspecific ST changes           MDM       Cardiac Monitor:   Pulse Readings from Last 1 Encounters:   02/07/25 82   , sinus, 82  interpreted by me.    Radiology findings:   No results found.        Medical Decision Making  Patient given IV antibiotics for UTI IV fluids to assist with hyperglycemia.  I spoke with endocrine concerns with her kidney function about risks of metformin and glipizide decision made to prescribe Onglyza.  Along with antibiotics she will have a repeat blood test on Tuesday order put in epic    Problems Addressed:  Hyperglycemia: acute illness or injury with systemic symptoms  Renal insufficiency: acute illness or injury  Urinary tract infection without hematuria, site unspecified: acute illness or injury with systemic symptoms    Amount and/or Complexity of Data Reviewed  External Data Reviewed: notes.     Details: Reviewed immediate care labs and notes  Labs: ordered. Decision-making details documented in ED Course.  ECG/medicine tests: ordered and independent interpretation performed. Decision-making details documented in ED Course.    Risk  Prescription drug management.        Disposition and Plan     Clinical Impression:  1. Urinary tract infection without hematuria, site unspecified    2. Hyperglycemia    3. Renal insufficiency        Disposition:  Discharge    Follow-up:  Ruben Sweeney MD  [address] [facility] [address]  [place] [address]  [phone]    Follow up      Ruben Sweeney MD  [address] [facility] [address]  [place] [address]  [phone]    Follow up        Medications Prescribed:  Discharge Medication List as of 2/7/2025  8:08 PM        START taking these medications    Details   sAXagliptin HCl (ONGLYZA) 5 MG Oral Tab Take 5 mg by mouth daily., Normal, Disp-30 tablet, R-0      cephALEXin 500 MG Oral Cap Take 1 capsule (500 mg total) by mouth 3 (three) times daily for 5 days., Normal, Disp-15 capsule, R-0

## 2025-02-10 ENCOUNTER — PATIENT OUTREACH (OUTPATIENT)
Dept: CASE MANAGEMENT | Age: OVER 89
End: 2025-02-10

## 2025-02-10 NOTE — PROGRESS NOTES
Transitions of Care Navigation  Discharge Date: 25  Contact Date: 2/10/2025    Transitions of Care Assessment:  SARAH BETH Initial Assessment    General:  Assessment completed with: Children;Patient (pt requested NCM call daughter Abbie)  Patient Subjective: I'm a little better, no issues urinating.  Chief Complaint: Urinary Symptoms  Weakness  Verify patient name and  with patient/ caregiver: Yes    Hospital Stay/Discharge:  Tell me what you understand of why you were in the hospital or emergency department: weakness  Prior to leaving the hospital were your Discharge Instructions reviewed with you?: Yes  Did you receive a copy of your written Discharge Instructions?: Yes  What questions do you have about your Discharge Instructions?: none  Do you feel better or worse since you left the hospital or emergency department?: Better    Follow - Up Appointment:  Do you have a follow-up appointment?: Yes  Date: 25  Physician: PCP  Are there any barriers to getting to your follow-up appointment?: No    Home Health/DME:  Prior to leaving the hospital was Home Health (HH) arranged for you?: No     Prior to leaving the hospital or emergency department was Durable Medical Equipment (DME), medical supplies, or infusions arranged for you?: No  Are DME/medical supply/infusions needs identified by staff during this assessment?: No     Medications/Diet:  Did any of your medications change, during or after your hospital stay or ED visit?: Yes  Do you have your new or updated medications?: Yes (except the Onglyza, see notes)  Do you understand what your medications are for and possible side effects?: Yes  Are there any reasons that keep you from taking your medication as prescribed?: No  Any concerns about medication refills?: No    Were you given a different diet per your Discharge Instructions?: No     Questions/Concerns:  Do you have any questions or concerns that have not been discussed?: No       Nursing Interventions:  NCM s/w patient. She states that she is doing a little better. She denied having any s/s of UTI. She denied having any fever, n/v/c/d, sob or any new or worsening symptoms. With regards to medication and appt, she requested [facility] call her daughter Abbie. [facility] called and s/w Abbie. She states that the patient is very tired still. She was very weak on Friday and Saturday but has gotten better today. She is still fatigued but better than she was. She states that her blood sugar fasting yesterday was 300. She is not sure what it was today as she is not with the patient. [facility] reviewed s/s of hyperglycemia; she v/u. She states that they have been unable to get the Onglyza as it is on backorder but was told that it would be ready for  today and then it wasn't. She was told that it might be ready tomorrow but would like to discuss with PCP alternative options. [facility] scheduled appt and instructed her to bring blood sugar meter and supplies as well. She v/u and states that she will also have pt get blood work prior to appt. Med review completed. She denied having any other questions or concerns.     Medications:  Medication Reconciliation:  I am aware of an inpatient discharge within the last 30 days.  The discharge medication list has been reconciled with the patient's current medication list and reviewed by me. See medication list for additions of new medication, and changes to current doses of medications and discontinued medications.  Current Outpatient Medications   Medication Sig Dispense Refill    sAXagliptin HCl (ONGLYZA) 5 MG Oral Tab Take 5 mg by mouth daily. 30 tablet 0    cephALEXin 500 MG Oral Cap Take 1 capsule (500 mg total) by mouth 3 (three) times daily for 5 days. 15 capsule 0    METOPROLOL TARTRATE 25 MG Oral Tab TAKE 1 TABLET TWICE DAILY 180 tablet 3    levothyroxine 100 MCG Oral Tab Take 1 tablet (100 mcg total) by mouth before breakfast. 90 tablet 0    pravastatin 40 MG Oral Tab Take 1 tablet (40 mg  total) by mouth nightly. 90 tablet 0    CLOPIDOGREL 75 MG Oral Tab TAKE 1 TABLET (75 MG TOTAL) BY MOUTH DAILY. 90 tablet 3    PANTOPRAZOLE 40 MG Oral Tab EC TAKE 1 TABLET (40 MG TOTAL) BY MOUTH DAILY. 90 tablet 3         Follow-up Appointments:      [facility]  Was TCC Ordered: No      Primary Care Provider (If no TCC appointment)  Does patient already have a PCP appointment scheduled? No  Nurse Care Manager Scheduled PCP office ER Follow-up appointment with patient    Specialist  Does the patient have any other follow-up appointment(s) need to be scheduled? No      [x]  Patient daughter Abbie verbally agrees to additional follow-up calls from Nurse Care Manager    Book By Date: 2/14/25

## 2025-02-11 ENCOUNTER — LAB ENCOUNTER (OUTPATIENT)
Dept: LAB | Facility: HOSPITAL | Age: OVER 89
End: 2025-02-11
Attending: EMERGENCY MEDICINE
Payer: MEDICARE

## 2025-02-11 ENCOUNTER — OFFICE VISIT (OUTPATIENT)
Dept: INTERNAL MEDICINE CLINIC | Facility: CLINIC | Age: OVER 89
End: 2025-02-11
Payer: MEDICARE

## 2025-02-11 VITALS
DIASTOLIC BLOOD PRESSURE: 76 MMHG | SYSTOLIC BLOOD PRESSURE: 116 MMHG | HEART RATE: 88 BPM | OXYGEN SATURATION: 97 % | TEMPERATURE: 97 F | WEIGHT: 145 LBS

## 2025-02-11 DIAGNOSIS — N18.4 STAGE 4 CHRONIC KIDNEY DISEASE (HCC): ICD-10-CM

## 2025-02-11 DIAGNOSIS — R73.9 HYPERGLYCEMIA: ICD-10-CM

## 2025-02-11 DIAGNOSIS — I48.91 ATRIAL FIBRILLATION, UNSPECIFIED TYPE (HCC): ICD-10-CM

## 2025-02-11 DIAGNOSIS — E11.65 TYPE 2 DIABETES MELLITUS WITH HYPERGLYCEMIA, WITHOUT LONG-TERM CURRENT USE OF INSULIN (HCC): Primary | ICD-10-CM

## 2025-02-11 DIAGNOSIS — B96.20 E. COLI UTI: ICD-10-CM

## 2025-02-11 DIAGNOSIS — N39.0 E. COLI UTI: ICD-10-CM

## 2025-02-11 DIAGNOSIS — I27.20 PULMONARY HTN (HCC): ICD-10-CM

## 2025-02-11 DIAGNOSIS — N28.9 RENAL INSUFFICIENCY: ICD-10-CM

## 2025-02-11 DIAGNOSIS — N18.4 TYPE 2 DIABETES MELLITUS WITH STAGE 4 CHRONIC KIDNEY DISEASE, WITHOUT LONG-TERM CURRENT USE OF INSULIN (HCC): ICD-10-CM

## 2025-02-11 DIAGNOSIS — R29.898 LEG WEAKNESS, BILATERAL: ICD-10-CM

## 2025-02-11 DIAGNOSIS — E11.22 TYPE 2 DIABETES MELLITUS WITH STAGE 4 CHRONIC KIDNEY DISEASE, WITHOUT LONG-TERM CURRENT USE OF INSULIN (HCC): ICD-10-CM

## 2025-02-11 LAB
ANION GAP SERPL CALC-SCNC: 6 MMOL/L (ref 0–18)
BUN BLD-MCNC: 42 MG/DL (ref 9–23)
BUN/CREAT SERPL: 26.1 (ref 10–20)
CALCIUM BLD-MCNC: 10.3 MG/DL (ref 8.7–10.4)
CHLORIDE SERPL-SCNC: 102 MMOL/L (ref 98–112)
CO2 SERPL-SCNC: 27 MMOL/L (ref 21–32)
CREAT BLD-MCNC: 1.61 MG/DL
EGFRCR SERPLBLD CKD-EPI 2021: 30 ML/MIN/1.73M2 (ref 60–?)
FASTING STATUS PATIENT QL REPORTED: YES
GLUCOSE BLD-MCNC: 286 MG/DL (ref 70–99)
OSMOLALITY SERPL CALC.SUM OF ELEC: 301 MOSM/KG (ref 275–295)
POTASSIUM SERPL-SCNC: 4.8 MMOL/L (ref 3.5–5.1)
SODIUM SERPL-SCNC: 135 MMOL/L (ref 136–145)

## 2025-02-11 PROCEDURE — 99214 OFFICE O/P EST MOD 30 MIN: CPT | Performed by: INTERNAL MEDICINE

## 2025-02-11 PROCEDURE — 1159F MED LIST DOCD IN RCRD: CPT | Performed by: INTERNAL MEDICINE

## 2025-02-11 PROCEDURE — 1160F RVW MEDS BY RX/DR IN RCRD: CPT | Performed by: INTERNAL MEDICINE

## 2025-02-11 PROCEDURE — 3078F DIAST BP <80 MM HG: CPT | Performed by: INTERNAL MEDICINE

## 2025-02-11 PROCEDURE — G2211 COMPLEX E/M VISIT ADD ON: HCPCS | Performed by: INTERNAL MEDICINE

## 2025-02-11 PROCEDURE — 3074F SYST BP LT 130 MM HG: CPT | Performed by: INTERNAL MEDICINE

## 2025-02-11 PROCEDURE — 1126F AMNT PAIN NOTED NONE PRSNT: CPT | Performed by: INTERNAL MEDICINE

## 2025-02-11 PROCEDURE — 80048 BASIC METABOLIC PNL TOTAL CA: CPT

## 2025-02-11 PROCEDURE — 36415 COLL VENOUS BLD VENIPUNCTURE: CPT

## 2025-02-11 NOTE — PROGRESS NOTES
Nya Worley female 89 year old       chief complaint:  djd knees , uti  , high  sugar  ,     friday  couldn't  get up   weak  polyuria polydip     hx of  dm -  no meds in a while       had  elevated  sugar -  recent  steroid injections      may be  part of cause       in er   couldn't get up  -  dx  uti -  ecoli  - ON ABX      saw cardiology  declined  watchman      DISCUSSED  RECENT  EKG      Has  diastolic dys  and  pulm htn -  doing well     Sugar  today  286 no symptoms now      Since this event she has been very deconditioned daughter noticed that it is harder for her to get up.  Has not been walking feet seem glued to the floor almost.                                   Patient Active Problem List   Diagnosis    Acquired hypothyroidism    Gait disorder    Dysequilibrium    Urge incontinence    Stage 4 chronic kidney disease ([facility])    Atrial fibrillation ([facility])    Pulmonary HTN ([facility])    PVD (peripheral vascular disease)    Diabetes mellitus, type 2 (pick complications) ([facility])        Current Outpatient Medications   Medication Sig Dispense Refill    linaGLIPtin 5 mg Oral Tab Take 1 tablet (5 mg total) by mouth daily. 30 tablet 11    cephALEXin 500 MG Oral Cap Take 1 capsule (500 mg total) by mouth 3 (three) times daily for 5 days. 15 capsule 0    METOPROLOL TARTRATE 25 MG Oral Tab TAKE 1 TABLET TWICE DAILY 180 tablet 3    levothyroxine 100 MCG Oral Tab Take 1 tablet (100 mcg total) by mouth before breakfast. 90 tablet 0    pravastatin 40 MG Oral Tab Take 1 tablet (40 mg total) by mouth nightly. 90 tablet 0    CLOPIDOGREL 75 MG Oral Tab TAKE 1 TABLET (75 MG TOTAL) BY MOUTH DAILY. 90 tablet 3    PANTOPRAZOLE 40 MG Oral Tab EC TAKE 1 TABLET (40 MG TOTAL) BY MOUTH DAILY. 90 tablet 3        Vitals:    02/11/25 1240   BP: 116/76   Pulse: 88   Temp: 97.2 °F (36.2 °C)   VITALSThere is no height or weight on file to calculate BMI.    Beverly looks comfortable not toxic not sick cognition is good.  Is in a  wheelchair and has generalized weakness.  No focal deficits noted.  Heart sounds irregular but controlled lungs are clear no JVD noted        Nya was seen today for er f/u.    Diagnoses and all orders for this visit:    Type 2 diabetes mellitus with hyperglycemia, without long-term current use of insulin (HCC)    Pulmonary HTN (HCC)    Atrial fibrillation, unspecified type (HCC)    Type 2 diabetes mellitus with stage 4 chronic kidney disease, without long-term current use of insulin (HCC)    Stage 4 chronic kidney disease (HCC)    Leg weakness, bilateral    E. coli UTI    Other orders  -     linaGLIPtin 5 mg Oral Tab; Take 1 tablet (5 mg total) by mouth daily.    For her diabetes recommend to push fluids at all times that should keep her from getting dehydrated and out of the hospital.  Will start her on linagliptin.  Discussed medications and kidney function if this does not work we will need to consider insulin.  Hopefully the steroids will wear out of her system and the infection is improving.    She has chronic kidney disease which complicates medications for her diabetes.  And the ability to concentrate her urine etc.    She has significant leg weakness now suspect its due to deconditioning from her UTI and sickness recommend to get some physical therapy at [facility].    She has no signs of infection now finished antibiotics    Her atrial fibrillation pulmonary hypertension and diastolic dysfunction all seems stable.  She recently was evaluated for watchman but decided against it.    Due to her high sugars I like to see her back in 2 weeks.                                    This note was prepared using Dragon Medical voice recognition dictation software and as a result, errors may occur. When identified, these errors have been corrected. While every attempt is made to correct errors during dictation, discrepancies may still exist

## 2025-02-12 ENCOUNTER — PATIENT MESSAGE (OUTPATIENT)
Dept: INTERNAL MEDICINE CLINIC | Facility: CLINIC | Age: OVER 89
End: 2025-02-12

## 2025-02-14 RX ORDER — PRAVASTATIN SODIUM 40 MG
40 TABLET ORAL NIGHTLY
Qty: 90 TABLET | Refills: 3 | Status: SHIPPED | OUTPATIENT
Start: 2025-02-14

## 2025-02-14 NOTE — TELEPHONE ENCOUNTER
Spoke with Abbie ( HIPAA authorized) daughter states her blood sugar last night at 8:30 PM over 400 which was several hours after eating. She states yesterday her AM pre prandial blood sugar was 300. Today her blood sugar AM pre prandial blood sugar 334. Nya is not available for first person triage. She denied headache, dizziness, nausea or vomiting. She is eating and told her daughter she is feeling better. She had completed antibiotic for UTI on 2/12/25.     RN encouraged Abbie to schedule sooner appointment with Dr. Sweeney since current appointment is 2/26/25. Abbie scheduled 9:40 AM with Dr. Sweeney 2/17/25 at [facility].     RN informed Abbie if her mother has elevated blood sugar over 400 or nausea, vomiting, headache, or dizziness take her to the emergency department. Daughter voiced understanding.     .

## 2025-02-17 ENCOUNTER — TELEPHONE (OUTPATIENT)
Age: OVER 89
End: 2025-02-17

## 2025-02-17 ENCOUNTER — OFFICE VISIT (OUTPATIENT)
Age: OVER 89
End: 2025-02-17
Payer: MEDICARE

## 2025-02-17 VITALS
DIASTOLIC BLOOD PRESSURE: 76 MMHG | HEART RATE: 94 BPM | OXYGEN SATURATION: 94 % | SYSTOLIC BLOOD PRESSURE: 120 MMHG | WEIGHT: 145 LBS

## 2025-02-17 DIAGNOSIS — E11.22 TYPE 2 DIABETES MELLITUS WITH STAGE 4 CHRONIC KIDNEY DISEASE, WITHOUT LONG-TERM CURRENT USE OF INSULIN (HCC): ICD-10-CM

## 2025-02-17 DIAGNOSIS — N18.4 TYPE 2 DIABETES MELLITUS WITH STAGE 4 CHRONIC KIDNEY DISEASE, WITHOUT LONG-TERM CURRENT USE OF INSULIN (HCC): Primary | ICD-10-CM

## 2025-02-17 DIAGNOSIS — E11.65 TYPE 2 DIABETES MELLITUS WITH HYPERGLYCEMIA, WITHOUT LONG-TERM CURRENT USE OF INSULIN (HCC): ICD-10-CM

## 2025-02-17 DIAGNOSIS — E03.9 ACQUIRED HYPOTHYROIDISM: ICD-10-CM

## 2025-02-17 DIAGNOSIS — N18.4 TYPE 2 DIABETES MELLITUS WITH STAGE 4 CHRONIC KIDNEY DISEASE, WITHOUT LONG-TERM CURRENT USE OF INSULIN (HCC): ICD-10-CM

## 2025-02-17 DIAGNOSIS — E11.22 TYPE 2 DIABETES MELLITUS WITH STAGE 4 CHRONIC KIDNEY DISEASE, WITHOUT LONG-TERM CURRENT USE OF INSULIN (HCC): Primary | ICD-10-CM

## 2025-02-17 DIAGNOSIS — R29.898 LEG WEAKNESS, BILATERAL: Primary | ICD-10-CM

## 2025-02-17 PROCEDURE — 3078F DIAST BP <80 MM HG: CPT | Performed by: INTERNAL MEDICINE

## 2025-02-17 PROCEDURE — G2211 COMPLEX E/M VISIT ADD ON: HCPCS | Performed by: INTERNAL MEDICINE

## 2025-02-17 PROCEDURE — 3074F SYST BP LT 130 MM HG: CPT | Performed by: INTERNAL MEDICINE

## 2025-02-17 PROCEDURE — 99214 OFFICE O/P EST MOD 30 MIN: CPT | Performed by: INTERNAL MEDICINE

## 2025-02-17 PROCEDURE — 1159F MED LIST DOCD IN RCRD: CPT | Performed by: INTERNAL MEDICINE

## 2025-02-17 PROCEDURE — 1160F RVW MEDS BY RX/DR IN RCRD: CPT | Performed by: INTERNAL MEDICINE

## 2025-02-17 PROCEDURE — 1126F AMNT PAIN NOTED NONE PRSNT: CPT | Performed by: INTERNAL MEDICINE

## 2025-02-17 RX ORDER — SAXAGLIPTIN 5 MG/1
5 TABLET, FILM COATED ORAL
COMMUNITY
Start: 2025-02-17

## 2025-02-17 RX ORDER — PIOGLITAZONE 30 MG/1
30 TABLET ORAL DAILY
Qty: 30 TABLET | Refills: 0 | Status: SHIPPED | OUTPATIENT
Start: 2025-02-17 | End: 2025-02-17

## 2025-02-17 RX ORDER — PIOGLITAZONE 15 MG/1
15 TABLET ORAL DAILY
Qty: 30 TABLET | Refills: 3 | Status: SHIPPED | OUTPATIENT
Start: 2025-02-17 | End: 2025-02-17

## 2025-02-17 RX ORDER — PIOGLITAZONE 15 MG/1
15 TABLET ORAL DAILY
Qty: 30 TABLET | Refills: 3 | Status: SHIPPED | OUTPATIENT
Start: 2025-02-17

## 2025-02-17 NOTE — PROGRESS NOTES
Nya Worley female 89 year old    With other daughter today       Chief complaint:   diabetes mellitus   Recent UTI  weakness     Sugars 190- 300 in am 200- 400 in evening  after starting  linagliptin      Seems slightly better  than phone call last  week    Finished  abx  for  UTI  -  has  no  sx no fever chills etc.      Her  weakness   persist -  no  therapy  yet   still in [facility]  at  [facility]                                  Patient Active Problem List   Diagnosis    Acquired hypothyroidism    Gait disorder    Dysequilibrium    Urge incontinence    Stage 4 chronic kidney disease ([facility])    Atrial fibrillation ([facility])    Pulmonary HTN ([facility])    PVD (peripheral vascular disease)    Diabetes mellitus, type 2 (pick complications) ([facility])        Current Outpatient Medications   Medication Sig Dispense Refill    sAXagliptin HCl 5 MG Oral Tab Take 5 mg by mouth.      pioglitazone (ACTOS) 15 MG Oral Tab Take 1 tablet (15 mg total) by mouth daily. 30 tablet 3    PRAVASTATIN 40 MG Oral Tab TAKE 1 TABLET EVERY NIGHT 90 tablet 3    METOPROLOL TARTRATE 25 MG Oral Tab TAKE 1 TABLET TWICE DAILY 180 tablet 3    levothyroxine 100 MCG Oral Tab Take 1 tablet (100 mcg total) by mouth before breakfast. 90 tablet 0    CLOPIDOGREL 75 MG Oral Tab TAKE 1 TABLET (75 MG TOTAL) BY MOUTH DAILY. 90 tablet 3    PANTOPRAZOLE 40 MG Oral Tab EC TAKE 1 TABLET (40 MG TOTAL) BY MOUTH DAILY. 90 tablet 3        Vitals:    02/17/25 0937   BP: 120/76   Pulse: 94   VITALSThere is no height or weight on file to calculate BMI.    Normal affect ,  normal cognition  looks great    Heart irregular      Vital signs look good.        Nya was seen today for hyperglycemia.    Diagnoses and all orders for this visit:    Type 2 diabetes mellitus with stage 4 chronic kidney disease, without long-term current use of insulin ([facility])    Acquired hypothyroidism    Type 2 diabetes mellitus with hyperglycemia, without long-term current use of insulin  (HCC)    Other orders  -     Discontinue: pioglitazone (ACTOS) 30 MG Oral Tab; Take 1 tablet (30 mg total) by mouth daily.  -     pioglitazone (ACTOS) 15 MG Oral Tab; Take 1 tablet (15 mg total) by mouth daily.    She has significant hyperglycemia secondary to diabetes.  This is complicated by her chronic kidney disease with a GFR right around 30.  For this reason will not use metformin.  She is already on saxagliptin.  I would like to avoid glyburide for obvious reasons.  Discussed starting insulin after long discussion with daily basal insulin decided to consider a TZD medication.  Since there is no issues with kidney function and does not have any known heart failure.  Will start  actos  along  with  previous  saxagliptin spine side effects the patient and daughter  Sent me her numbers in the next couple of days.      She has no further UTI symptoms.  This is why I am not using a SGLT medication    We discussed her weakness and the importance of getting her some therapy and moving.                                  This note was prepared using Dragon Medical voice recognition dictation software and as a result, errors may occur. When identified, these errors have been corrected. While every attempt is made to correct errors during dictation, discrepancies may still exist

## 2025-02-17 NOTE — TELEPHONE ENCOUNTER
Abbie is also requesting to cancel prescription pioglitazone (ACTOS) and re-send to [facility] in [place] on [facility].

## 2025-02-17 NOTE — TELEPHONE ENCOUNTER
Spoke with Susie and then the Pharmacist of [facility] to cancel pioglitazone (ACTOS) 15 mg and RN will re-send to [facility].       Pioglitazone (Actos) 15 mg sent to [facility] in [place].

## 2025-02-17 NOTE — TELEPHONE ENCOUNTER
PT order, office notes from 2/11/25 and 2/17/25, and face sheet faxed to [facility] at [phone].

## 2025-02-17 NOTE — TELEPHONE ENCOUNTER
Lynnette from [facility] called stating daughter requested for pt to have PT.  Lynnette is requesting  order for PT along with Drs note specifying why she needs PT  To please fax over to [phone]  # [phone]   Yes

## 2025-02-17 NOTE — TELEPHONE ENCOUNTER
Spoke with Abbie ( HIPAA authorized) daughter verified that she is requesting [facility] PT. Abbie is also requesting to cancel prescription pioglitazone (ACTOS) and re-send to [facility] in [place] on [address] [facility].       External PT pended for [facility]; authorize if appropriate.

## 2025-02-18 ENCOUNTER — PATIENT OUTREACH (OUTPATIENT)
Dept: CASE MANAGEMENT | Age: OVER 89
End: 2025-02-18

## 2025-02-18 NOTE — PROGRESS NOTES
SARAH BETH Follow-up Assessment    General:  Assessment completed with: Children (daughter Abbie)  Patient Subjective: She's doing better. She's on the medication for the blood sugar (Onglyza and Actos). The doctors office prescribed something else but when we went to the pharmacy to pick it up  the onglyza was there so we were able to get that for her.   229 last night. She's walking better. Everything is getting better.  Chief Complaint: urinary symptoms, weakness    Progress/Care Plan:  Is the patient progressing as planned?: Yes  Care Plan Update: Pt completed appt with PCP. PCP ordered Actos and pt started Onglyza. Blood sugar last night was 229. Daughter not with her this morning and not sure what it was. Pt doing better overall. [facility] home PT will be starting tomorrow. Daughter states that she is walking better. She denied any new or worsening symptoms.  New Care Plan: Pt doing better. No need for further follow up calls but daughter did agree to call [facility] should there be any questions or concerns  Frequency/Follow Up Plan: n/a     Notes:  Navigator Notes: NCM s/w pt's daughter Abbie. She states that the patient is doing better and blood sugar seems to be going down. She states that PCP goal is for blood sugar to be 150-220. She will be starting home PT with [facility]. She did get the Onglyza and Actos. She denied that the patient had any of her UTI symptoms. She denied having any questions or concerns.

## 2025-03-05 ENCOUNTER — PATIENT MESSAGE (OUTPATIENT)
Age: OVER 89
End: 2025-03-05

## 2025-03-05 DIAGNOSIS — N18.4 TYPE 2 DIABETES MELLITUS WITH STAGE 4 CHRONIC KIDNEY DISEASE, WITHOUT LONG-TERM CURRENT USE OF INSULIN (HCC): ICD-10-CM

## 2025-03-05 DIAGNOSIS — E11.22 TYPE 2 DIABETES MELLITUS WITH STAGE 4 CHRONIC KIDNEY DISEASE, WITHOUT LONG-TERM CURRENT USE OF INSULIN (HCC): ICD-10-CM

## 2025-03-05 RX ORDER — PIOGLITAZONE 15 MG/1
15 TABLET ORAL DAILY
Qty: 30 TABLET | Refills: 3 | OUTPATIENT
Start: 2025-03-05

## 2025-03-05 RX ORDER — SAXAGLIPTIN 5 MG/1
5 TABLET, FILM COATED ORAL
Qty: 30 TABLET | Refills: 0 | OUTPATIENT
Start: 2025-03-05

## 2025-03-06 RX ORDER — SAXAGLIPTIN 5 MG/1
5 TABLET, FILM COATED ORAL DAILY
Qty: 90 TABLET | Refills: 2 | Status: SHIPPED | OUTPATIENT
Start: 2025-03-06

## 2025-03-18 ENCOUNTER — OFFICE VISIT (OUTPATIENT)
Age: OVER 89
End: 2025-03-18
Payer: MEDICARE

## 2025-03-18 ENCOUNTER — OFFICE VISIT (OUTPATIENT)
Dept: DERMATOLOGY CLINIC | Facility: CLINIC | Age: OVER 89
End: 2025-03-18

## 2025-03-18 VITALS
DIASTOLIC BLOOD PRESSURE: 68 MMHG | OXYGEN SATURATION: 96 % | HEART RATE: 63 BPM | WEIGHT: 154 LBS | SYSTOLIC BLOOD PRESSURE: 122 MMHG | HEIGHT: 62.5 IN | BODY MASS INDEX: 27.63 KG/M2

## 2025-03-18 DIAGNOSIS — N18.32 TYPE 2 DIABETES MELLITUS WITH STAGE 3B CHRONIC KIDNEY DISEASE, WITHOUT LONG-TERM CURRENT USE OF INSULIN (HCC): ICD-10-CM

## 2025-03-18 DIAGNOSIS — T14.8XXA BLISTER: Primary | ICD-10-CM

## 2025-03-18 DIAGNOSIS — R21 RASH AND NONSPECIFIC SKIN ERUPTION: Primary | ICD-10-CM

## 2025-03-18 DIAGNOSIS — E11.22 TYPE 2 DIABETES MELLITUS WITH STAGE 3B CHRONIC KIDNEY DISEASE, WITHOUT LONG-TERM CURRENT USE OF INSULIN (HCC): ICD-10-CM

## 2025-03-18 PROCEDURE — 3074F SYST BP LT 130 MM HG: CPT | Performed by: INTERNAL MEDICINE

## 2025-03-18 PROCEDURE — 1126F AMNT PAIN NOTED NONE PRSNT: CPT | Performed by: INTERNAL MEDICINE

## 2025-03-18 PROCEDURE — 99213 OFFICE O/P EST LOW 20 MIN: CPT | Performed by: INTERNAL MEDICINE

## 2025-03-18 PROCEDURE — 3008F BODY MASS INDEX DOCD: CPT | Performed by: INTERNAL MEDICINE

## 2025-03-18 PROCEDURE — 99204 OFFICE O/P NEW MOD 45 MIN: CPT | Performed by: STUDENT IN AN ORGANIZED HEALTH CARE EDUCATION/TRAINING PROGRAM

## 2025-03-18 PROCEDURE — 3078F DIAST BP <80 MM HG: CPT | Performed by: INTERNAL MEDICINE

## 2025-03-18 PROCEDURE — 1159F MED LIST DOCD IN RCRD: CPT | Performed by: STUDENT IN AN ORGANIZED HEALTH CARE EDUCATION/TRAINING PROGRAM

## 2025-03-18 PROCEDURE — G2211 COMPLEX E/M VISIT ADD ON: HCPCS | Performed by: INTERNAL MEDICINE

## 2025-03-18 PROCEDURE — 1160F RVW MEDS BY RX/DR IN RCRD: CPT | Performed by: STUDENT IN AN ORGANIZED HEALTH CARE EDUCATION/TRAINING PROGRAM

## 2025-03-18 RX ORDER — KETOCONAZOLE 20 MG/G
CREAM TOPICAL
Qty: 60 G | Refills: 5 | Status: SHIPPED | OUTPATIENT
Start: 2025-03-18

## 2025-03-18 NOTE — PROGRESS NOTES
New patient     Referred by:   No referring provider defined for this encounter.     CHIEF COMPLAINT: Blister    HISTORY OF PRESENT ILLNESS: .    1. Blister   Location: R foot    Duration: 2-3 days  Bleeding, growing, changing?: No  Scaly?:No    Itchy?:No    Current treatment: None  Past treatments: None      DERM HISTORY:  History of skin cancer: No  History of chronic skin disease/condition: No    FAMILY HISTORY:  History of melanoma: No    History/Other:    REVIEW OF SYSTEMS:  Constitutional: Denies fever, chills, unintentional weight loss.   Skin as per HPI    PAST MEDICAL HISTORY:  Past Medical History:    Acute, but ill-defined, cerebrovascular disease    Arthritis    Diabetes (HCC)    Essential hypertension    Hyperlipidemia    Hypothyroidism    Osteoarthritis    Stroke (HCC)       Medications  Current Outpatient Medications   Medication Sig Dispense Refill    sAXagliptin HCl 5 MG Oral Tab Take 5 mg by mouth daily. 90 tablet 2    pioglitazone (ACTOS) 15 MG Oral Tab Take 1 tablet (15 mg total) by mouth daily. 30 tablet 3    PRAVASTATIN 40 MG Oral Tab TAKE 1 TABLET EVERY NIGHT 90 tablet 3    METOPROLOL TARTRATE 25 MG Oral Tab TAKE 1 TABLET TWICE DAILY 180 tablet 3    levothyroxine 100 MCG Oral Tab Take 1 tablet (100 mcg total) by mouth before breakfast. 90 tablet 0    CLOPIDOGREL 75 MG Oral Tab TAKE 1 TABLET (75 MG TOTAL) BY MOUTH DAILY. 90 tablet 3    PANTOPRAZOLE 40 MG Oral Tab EC TAKE 1 TABLET (40 MG TOTAL) BY MOUTH DAILY. 90 tablet 3       Objective:    PHYSICAL EXAM:  General: awake, alert, no acute distress  Skin: Skin exam was performed today including the following: feet. Pertinent findings include:   - R dorsal foot with a drained bulla  - T 1st toe with onychostystophy   - Lateral foot with erythema and scaling.     ASSESSMENT & PLAN:  Pathophysiology of diagnoses discussed with patient.  Therapeutic options reviewed. Risks, benefits, and alternatives discussed with patient. Instructions reviewed at  length.    #Rash and nonspecific skin eruption, suspect tinea pedis vs edema bullae.Low suspicion for inflammatory etiologies such as bullous pemphigoid given absence of surrounding erythema.   - Recommend Aquaphor spray until bulla heals. Recommended keeping top of bulla intact as this will serve as a natural bandage.   - Start Ketoconazole cream once blister healed, use twice daily to foot including toes for 4 weeks      Return to clinic: 2 months or sooner if something concerning arises     Ilya Bacon MD    By signing my name below, INicko MA,  attest that this documentation has been prepared under the direction and in the presence of Ilya Bacon MD.   Electronically Signed: Nicko LOCKWOOD MA, 3/18/2025, 2:49 PM.    I, Ilya Bacon MD,  personally performed the services described in this documentation. All medical record entries made by the scribe were at my direction and in my presence.  I have reviewed the chart and agree that the record reflects my personal performance and is accurate and complete.  Ilya Bacon MD, 3/18/2025, 3:29 PM

## 2025-03-18 NOTE — PROGRESS NOTES
Nya Worley female 89 year old       Chief complaint:DM , blisters      Now  watching  diet  new med  actos      Fastings  are averaging - 130  - pp  220       New  blisters started over  weekend -  no  pain no itch-  had  similar  blister  years ago                 Patient Active Problem List   Diagnosis    Acquired hypothyroidism    Gait disorder    Dysequilibrium    Urge incontinence    Stage 4 chronic kidney disease (HCC)    Atrial fibrillation ([facility])    Pulmonary HTN ([facility])    PVD (peripheral vascular disease)    Diabetes mellitus, type 2 (pick complications) ([facility])        Current Outpatient Medications   Medication Sig Dispense Refill    sAXagliptin HCl 5 MG Oral Tab Take 5 mg by mouth daily. 90 tablet 2    pioglitazone (ACTOS) 15 MG Oral Tab Take 1 tablet (15 mg total) by mouth daily. 30 tablet 3    PRAVASTATIN 40 MG Oral Tab TAKE 1 TABLET EVERY NIGHT 90 tablet 3    METOPROLOL TARTRATE 25 MG Oral Tab TAKE 1 TABLET TWICE DAILY 180 tablet 3    levothyroxine 100 MCG Oral Tab Take 1 tablet (100 mcg total) by mouth before breakfast. 90 tablet 0    CLOPIDOGREL 75 MG Oral Tab TAKE 1 TABLET (75 MG TOTAL) BY MOUTH DAILY. 90 tablet 3    PANTOPRAZOLE 40 MG Oral Tab EC TAKE 1 TABLET (40 MG TOTAL) BY MOUTH DAILY. 90 tablet 3        Vitals:    03/18/25 1349   BP: 122/68   Pulse: 63   VITALSBody mass index is 27.72 kg/m².      General: Healthy looking- not toxic or sick looking      Has walker     Cognition oriented and alert          Heart: Regular sounding, S1, S2 nl , no murmu    Extremities: No  edema noted     Skin:         Neurological: No focal deficit , no tremor       Nya was seen today for follow - up.    Diagnoses and all orders for this visit:    Blister    Type 2 diabetes mellitus with stage 3b chronic kidney disease, without long-term current use of insulin ([facility])    For her blister will refer to dermatology am concerned about bullous pemphigoid.    Sugars seem to be improving continue present  treatment        This note was prepared using Dragon Medical voice recognition dictation software and as a result, errors may occur. When identified, these errors have been corrected. While every attempt is made to correct errors during dictation, discrepancies may still exist

## 2025-03-27 ENCOUNTER — TELEPHONE (OUTPATIENT)
Age: OVER 89
End: 2025-03-27

## 2025-03-27 NOTE — TELEPHONE ENCOUNTER
Daughter of pt is calling stating that saw dr. Sweeney on 03/18 due to blister on right foot. Pt did see dermatologist same day but when to see dermatologist blister pop and now pt is concern of maybe losing foot and daughter wants to know if pt could be sent to wound care or what could be done.       Please call and advise

## 2025-03-27 NOTE — TELEPHONE ENCOUNTER
Spoke with patient. She states that her blister popped and she is now getting a new blister. Aquaphor spray is not helping as recommended by Dr. Bacon. She is concern about the foot not healing. She has not started  Ketoconazole cream yet. Please call and follow up.

## 2025-03-31 NOTE — TELEPHONE ENCOUNTER
Staff please call and get condition update on this patient. If needed I can see this patient this week to be sure her foot is healing.

## 2025-04-08 ENCOUNTER — TELEPHONE (OUTPATIENT)
Age: OVER 89
End: 2025-04-08

## 2025-04-08 NOTE — TELEPHONE ENCOUNTER
Teresa physical therapist called from [facility]  to inform pcp that pt was seen and evaluated for PT and will be receiving services for the next 8 weeks to improve strength

## 2025-04-16 ENCOUNTER — TELEPHONE (OUTPATIENT)
Age: OVER 89
End: 2025-04-16

## 2025-04-16 NOTE — TELEPHONE ENCOUNTER
Discussed with daughter blood flow test which were abnormal podiatry is recommending not to get aggressive since she does not have obvious claudication and good wound healing blister has resolved.  Seeing endocrinology for glucose control soon

## 2025-06-04 DIAGNOSIS — N18.4 TYPE 2 DIABETES MELLITUS WITH STAGE 4 CHRONIC KIDNEY DISEASE, WITHOUT LONG-TERM CURRENT USE OF INSULIN (HCC): ICD-10-CM

## 2025-06-04 DIAGNOSIS — E11.22 TYPE 2 DIABETES MELLITUS WITH STAGE 4 CHRONIC KIDNEY DISEASE, WITHOUT LONG-TERM CURRENT USE OF INSULIN (HCC): ICD-10-CM

## 2025-06-05 RX ORDER — METOPROLOL TARTRATE 25 MG/1
25 TABLET, FILM COATED ORAL 2 TIMES DAILY
Qty: 180 TABLET | Refills: 3 | Status: SHIPPED | OUTPATIENT
Start: 2025-06-05

## 2025-06-05 RX ORDER — PIOGLITAZONE 15 MG/1
15 TABLET ORAL DAILY
Qty: 30 TABLET | Refills: 3 | Status: SHIPPED | OUTPATIENT
Start: 2025-06-05

## 2025-06-05 NOTE — TELEPHONE ENCOUNTER
Please review. Refill failed protocol.     Patient is requesting to have refills sent to [facility].     Metoprolol 25 MG - 1 year supply sent 2/5/25 to [facility]     Sent [email or web address] message to patient.

## 2025-07-07 ENCOUNTER — OFFICE VISIT (OUTPATIENT)
Age: OVER 89
End: 2025-07-07
Payer: MEDICARE

## 2025-07-07 VITALS
DIASTOLIC BLOOD PRESSURE: 74 MMHG | OXYGEN SATURATION: 99 % | WEIGHT: 155 LBS | HEART RATE: 72 BPM | HEIGHT: 62.5 IN | BODY MASS INDEX: 27.81 KG/M2 | SYSTOLIC BLOOD PRESSURE: 122 MMHG

## 2025-07-07 DIAGNOSIS — R26.9 GAIT DISORDER: ICD-10-CM

## 2025-07-07 DIAGNOSIS — R42 DYSEQUILIBRIUM: ICD-10-CM

## 2025-07-07 DIAGNOSIS — S90.829D BLISTER OF FOOT, UNSPECIFIED LATERALITY, SUBSEQUENT ENCOUNTER: ICD-10-CM

## 2025-07-07 DIAGNOSIS — E11.22 TYPE 2 DIABETES MELLITUS WITH STAGE 3B CHRONIC KIDNEY DISEASE, WITHOUT LONG-TERM CURRENT USE OF INSULIN (HCC): ICD-10-CM

## 2025-07-07 DIAGNOSIS — Z86.73 HISTORY OF CVA (CEREBROVASCULAR ACCIDENT): ICD-10-CM

## 2025-07-07 DIAGNOSIS — E03.9 ACQUIRED HYPOTHYROIDISM: ICD-10-CM

## 2025-07-07 DIAGNOSIS — I48.91 ATRIAL FIBRILLATION, UNSPECIFIED TYPE (HCC): Primary | ICD-10-CM

## 2025-07-07 DIAGNOSIS — N18.32 TYPE 2 DIABETES MELLITUS WITH STAGE 3B CHRONIC KIDNEY DISEASE, WITHOUT LONG-TERM CURRENT USE OF INSULIN (HCC): ICD-10-CM

## 2025-07-07 LAB
CARTRIDGE EXPIRATION DATE: ABNORMAL DATE
HEMOGLOBIN A1C: 6.5 % (ref 4.3–5.6)

## 2025-07-07 NOTE — PROGRESS NOTES
Nya Worley is a 89 year old  who presents for a Subsequent Annual Wellness visit (Pt already had Initial Annual Wellness)    Discussed medicare wellness , reviewed assessments and health maintenance for screening and immunizations.  We reviewed and assessed medical problems and medications    In addition to the wellness visit addressed concerns for   History of Present Illness  Nya Worley is a 89 year old female who presents for follow-up on her medication management and blister on her foot.    She has a large blister on the top of her foot, which is currently resolving, though the skin remains sore. Initially, a podiatrist suspected an infection and prescribed an antibiotic, which she completed. She believes the blister was irritated from having the skin removed rather than infected. A smaller blister is present on the inside of her ankle, which she is managing by keeping it clean and open when possible. She is able to wear a shoe for short periods, such as walking to dinner.    She has a history of atrial fibrillation and was previously on Eliquis, which was discontinued due to knee pain that she attributed to the medication. She has not been on a blood thinner sinceShe has been undergoing physical and occupational therapy, which has improved her strength significantly since February of the previous year when she was very weak and required a wheelchair. No recent falls and she feels much stronger now.    Her A1c was recently measured at 6.5. No recent urinary tract infection symptoms and her memory is 'pretty good'. She has completed her physical therapy and reports occasional bad days of sadness but no significant depression. She has discussed end-of-life preferences with her family and is considering arrangements for burial in [place].          Allergies:   has no known allergies.  Medical History:    has a past medical history of Acute, but ill-defined, cerebrovascular disease, Arthritis,  Diabetes (HCC), Essential hypertension, Hyperlipidemia, Hypothyroidism, Osteoarthritis, and Stroke (HCC).  Surgical History:    has a past surgical history that includes tonsillectomy; back surgery; cataract; colonoscopy; and .   Family History:   family history includes Diabetes in her brother.  Social History:    reports that she has never smoked. She has never used smokeless tobacco. She reports that she does not currently use alcohol. She reports that she does not use drugs.        Fall Risk Assessment:   She has been screened for Falls and is High Risk. Fall Prevention information provided to patient in After Visit Summary.    Do you feel unsteady when standing or walking?: Yes  Do you worry about falling?: No  Have you fallen in the past year?: No   Has  WLKER  - did PT    Cognitive Assessment:   Abnormal  What day of the week is this?: Correct  What month is it?: Correct  What year is it?: Correct  Recall \"Ball\": Incorrect  Recall \"Flag\": Incorrect  Recall \"Tree\": Correct    Functional Ability/Status:   Nya Worley has some abnormal functions as listed below:  She has Dressing and/or Bathing issues based on screening of functional status.  Difficulty dressing or bathing?: No  Bathing or Showering: Need some help  Dressing: Able without help  She has Driving difficulties based on screening of functional status. She has Meal Preparation difficulties based on screening of functional status.She has difficulties Managing Money/Bills based on screening of functional status.She has difficulties Shopping for Groceries based on screening of functional status. She has difficulties Affording Meds based on screening of functional status. She has Hearing problems based on screening of functional status.She has Walking problems based on screening of functional status. She has problems with Daily Activities based on screening of functional status.         Depression Screening (PHQ-2/PHQ-9): PHQ-2 SCORE: 0  , done  7/7/2025          Advanced Directives:   discussed end of life issues   quality of life is most important   no heroic measures  family aware of wishes and would act on behalf   DNR     Tobacco:  She has never smoked tobacco.    CAGE Alcohol Screen:   CAGE screening score of 0 on 7/7/2025, showing low risk of alcohol abuse.          Patient Care Team:  Ruben Sweeney MD as PCP - General (Internal Medicine)          Objective:   /74   Pulse 72   Ht 5' 2.5\" (1.588 m)   Wt 155 lb (70.3 kg)   SpO2 99%   BMI 27.90 kg/m²    Estimated body mass index is 27.9 kg/m² as calculated from the following:    Height as of this encounter: 5' 2.5\" (1.588 m).    Weight as of this encounter: 155 lb (70.3 kg).  neuro   and  cognition are nl - slow moving  with walker  still seems  weak   Cor is regular sounding   lungs clear   Has  blister     Medicare Hearing Assessment:  Hearing Screening    Time taken: 7/7/2025 12:47 PM  Entry User: Ruben Sweeney MD  Screening Method: Finger Rub  Finger Rub Result: Pass       Visual Acuity:   Visual Acuity:   Right Eye Visual Acuity: Corrected Right Eye Chart Acuity: 20/20   Left Eye Visual Acuity: Corrected Left Eye Chart Acuity: 20/20   Both Eyes Visual Acuity: Corrected Both Eyes Chart Acuity: 20/20   Able To Tolerate Visual Acuity: Yes      Medications - Current[1]     ASSESSMENT  and   PLAN    Medicare wellness  Dicussed the screening assessments, disease prevention and screening  and immunization for age-   Recommend  healthy diet, lifestyle, and exercise. Discussed current state of health.    1. Acquired hypothyroidism  Currently on levothyroxine 100 mcg will check labs in the future currently seems euthyroid continue present dosing          3. Gait disorder  Patient has persistent gait abnormality noted on exam today this stems from generalized arthritis and weakness.  She did physical therapy and feels like she has made significant progress at this time does not feel she needs  further therapy.  Recommend to continue using her walker.    4. Dysequilibrium  As above another component to her gait disorder is disequilibrium.  Has history of CVA in the past do not think she needs any further workup at this point her symptoms are stable    5. History of CVA (cerebrovascular accident)  Has some residual issues with gait and disequilibrium no symptoms of any new disease recommend to stay on Plavix.  In addition to her wellness visit we addressed specifically the following issues  6. Atrial fibrillation, unspecified type ([facility])  Has history of atrial fibrillation and her weakness has improved significantly we discussed going on anticoagulation to reduce stroke.  Is aware of the benefits and the risk we will start Eliquis low-dose because of age and decrease in kidney function.    7. Type 2 diabetes mellitus with stage 3b chronic kidney disease, without long-term current use of insulin ([facility])  Discussed her diabetes control-currently on Actos and saxagliptin recommend to continue with current dosing.  - POC Hemoglobin A1C    8. Blisters of foot  She has persistent blisters on feet-is currently being managed by podiatry.  Recommend to follow-up with dermatology since this is a reoccurrence.  Initial input by dermatology noted low suspicion for pemphigus or autoimmune disorders at that time.                   No follow-ups on file.     Ruben Sweeney MD      General Health:  In the past six months, have you lost more than 10 pounds without trying?: 2 - No  Has your appetite been poor?: No  Type of Diet: Balanced  How does the patient maintain a good energy level?: Daily Walks  How would you describe your daily physical activity?: Light  How would you describe your current health state?: Good  How do you maintain positive mental well-being?: Social Interaction  On a scale of 0 to 10, with 0 being no pain and 10 being severe pain, what is your pain level?: 3 - (Mild)  In the past six months, have you  experienced urine leakage?: 1-Yes  At any time do you feel concerned for the safety/well-being of yourself and/or your children, in your home or elsewhere?: No  Have you had any immunizations at another office such as Influenza, Hepatitis B, Tetanus, or Pneumococcal?: No       Nya Worley's SCREENING SCHEDULE   Tests on this list are recommended by your physician but may not be covered, or covered at this frequency, by your insurer.   Please check with your insurance carrier before scheduling to verify coverage.   PREVENTATIVE SERVICES FREQUENCY &  COVERAGE DETAILS LAST COMPLETION DATE   Diabetes Screening    Fasting Blood Sugar /  Glucose    One screening every 12 months if never tested or if previously tested but not diagnosed with pre-diabetes   One screening every 6 months if diagnosed with pre-diabetes Lab Results   Component Value Date     (H) 02/11/2025        Cardiovascular Disease Screening    Lipid Panel  Cholesterol  Lipoprotein (HDL)  Triglycerides Covered every 5 years for all Medicare beneficiaries without apparent signs or symptoms of cardiovascular disease No results found for: \"CHOLEST\", \"HDL\", \"LDL\", \"LDLD\", \"LDLC\", \"TRIG\"      Electrocardiogram (EKG)   Covered if needed at Welcome to Medicare, and non-screening if indicated for medical reasons 02/08/2025      Ultrasound Screening for Abdominal Aortic Aneurysm (AAA) Covered once in a lifetime for one of the following risk factors    Men who are 65-75 years old and have ever smoked    Anyone with a family history -     Colorectal Cancer Screening  Covered for ages 50-85; only need ONE of the following:    Colonoscopy   Covered every 10 years    Covered every 2 years if patient is at high risk or previous colonoscopy was abnormal -    No recommendations at this time    Flexible Sigmoidoscopy   Covered every 4 years -    Fecal Occult Blood Test Covered annually -   Bone Density Screening    Bone density screening    Covered every 2 years  after age 65 if diagnosed with risk of osteoporosis or estrogen deficiency.    Covered yearly for long-term glucocorticoid medication use (Steroids) No results found for this or any previous visit.      No recommendations at this time   Pap and Pelvic    Pap   Covered every 2 years for women at normal risk; Annually if at high risk -  No recommendations at this time    Chlamydia Annually if high risk -  No recommendations at this time   Screening Mammogram    Mammogram     Recommend annually for all female patients aged 40 and older    One baseline mammogram covered for patients aged 35-39 -    No recommendations at this time    Immunizations    Influenza Covered once per flu season  Please get every year -  No recommendations at this time    Pneumococcal Each vaccine ([id number] & [id number]) covered once after 65 Prevnar 13: -    [id number]: -     Pneumococcal Vaccination(1 of 2 - PCV) Never done    Hepatitis B One screening covered for patients with certain risk factors   -  No recommendations at this time    Tetanus Toxoid Not covered by Medicare Part B unless medically necessary (cut with metal); may be covered with your pharmacy prescription benefits -    Tetanus, Diptheria and Pertusis TD and TDaP Not covered by Medicare Part B -  No recommendations at this time    Zoster Not covered by Medicare Part B; may be covered with your pharmacy  prescription benefits -  Zoster Vaccines(1 of 2) Never done     Diabetes      Hemoglobin A1C Annually; if last result is elevated, may be asked to retest more frequently.    Medicare covers every 3 months Lab Results   Component Value Date     (H) 02/25/2024    A1C 6.5 (A) 07/07/2025       No recommendations at this time    Creat/alb ratio Annually No results found for: \"MICROALBCREA\", \"MALBCRECALC\"    LDL Annually No results found for: \"LDL\", \"LDLC\"    Dilated Eye Exam Annually Last Diabetic Eye Exam:  No data recorded  No data recorded               The following  individual(s) verbally consented to be recorded using ambient AI listening technology and understand that they can each withdraw their consent to this listening technology at any point by asking the clinician to turn off or pause the recording: YES    Patient name: Nya Worley  Additional names:           [1]   Current Outpatient Medications:     metoprolol tartrate 25 MG Oral Tab, Take 1 tablet (25 mg total) by mouth 2 (two) times daily., Disp: 180 tablet, Rfl: 3    pioglitazone (ACTOS) 15 MG Oral Tab, Take 1 tablet (15 mg total) by mouth daily., Disp: 30 tablet, Rfl: 3    ketoconazole 2 % External Cream, Once blister healed use twice daily to foot, Disp: 60 g, Rfl: 5    sAXagliptin HCl 5 MG Oral Tab, Take 5 mg by mouth daily., Disp: 90 tablet, Rfl: 2    PRAVASTATIN 40 MG Oral Tab, TAKE 1 TABLET EVERY NIGHT, Disp: 90 tablet, Rfl: 3    levothyroxine 100 MCG Oral Tab, Take 1 tablet (100 mcg total) by mouth before breakfast., Disp: 90 tablet, Rfl: 0    CLOPIDOGREL 75 MG Oral Tab, TAKE 1 TABLET (75 MG TOTAL) BY MOUTH DAILY., Disp: 90 tablet, Rfl: 3    PANTOPRAZOLE 40 MG Oral Tab EC, TAKE 1 TABLET (40 MG TOTAL) BY MOUTH DAILY., Disp: 90 tablet, Rfl: 3

## 2025-07-11 ENCOUNTER — PATIENT MESSAGE (OUTPATIENT)
Age: OVER 89
End: 2025-07-11

## 2025-07-15 NOTE — TELEPHONE ENCOUNTER
Spoke with Abbie ( HIPAA authorized) daughter informed her Dr. Sweeney is treating 2 different conditions but he has concerns her mother's fall risk and bleeding. Dr. Sweeney will hold order for Coumadin until next follow-up and assess her ambulation and fall risk. RN requesting Abbie to inquire on next follow-up regarding Coumadin and fall risk? Daughter agreed voiced understanding.

## 2025-07-15 NOTE — TELEPHONE ENCOUNTER
Left a message on Abbie's ( HIPAA authorized) daughter voice mail to call the office.     Inquire if her mother would want to start Coumadin? If so, place Coumadin Clinic referral.

## 2025-07-15 NOTE — TELEPHONE ENCOUNTER
Spoke with Abbie ( HIPAA authorized) daughter is inquiring if needs to take both CLOPIDOGREL and another anticoagulant?

## 2025-07-25 RX ORDER — LEVOTHYROXINE SODIUM 100 UG/1
100 TABLET ORAL
Qty: 90 TABLET | Refills: 3 | Status: SHIPPED | OUTPATIENT
Start: 2025-07-25

## 2025-08-11 RX ORDER — PRAVASTATIN SODIUM 40 MG
40 TABLET ORAL NIGHTLY
Qty: 90 TABLET | Refills: 3 | Status: SHIPPED | OUTPATIENT
Start: 2025-08-11

## 2025-08-15 RX ORDER — CLOPIDOGREL BISULFATE 75 MG/1
75 TABLET ORAL DAILY
Qty: 90 TABLET | Refills: 3 | Status: SHIPPED | OUTPATIENT
Start: 2025-08-15

## (undated) NOTE — LETTER
AUTHORIZATION FOR SURGICAL OPERATION OR OTHER PROCEDURE    1. I hereby authorize Dr. Schaefer/Katarzyna Sadler PA-C, and [facility] staff assigned to my case to perform the following operation and/or procedure at the [facility] site:    Left knee cortisone injection  _______________________________________________________________________________________________      _______________________________________________________________________________________________    2.  My physician has explained the nature and purpose of the operation or other procedure, possible alternative methods of treatment, the risks involved, and the possibility of complication to me.  I acknowledge that no guarantee has been made as to the result that may be obtained.  3.  I recognize that, during the course of this operation, or other procedure, unforseen conditions may necessitate additional or different procedure than those listed above.  I, therefore, further authorize and request that the above named physician, his/her physician assistants or designees perform such procedures as are, in his/her professional opinion, necessary and desirable.  4.  Any tissue or organs removed in the operation or other procedure may be disposed of by and at the discretion of the [facility] and [facility].  5.  I understand that in the event of a medical emergency, I will be transported by local paramedics to [facility] or other hospital emergency department.  6.  I certify that I have read and fully understand the above consent to operation and/or other procedure.    7.  I acknowledge that my physician has explained sedation/analgesia administration to me including the risks and benefits.  I consent to the administration of sedation/analgesia as may be necessary or desirable in the judgement of my physician.    Witness signature: ___________________________________________________ Date:   ______/______/_____                    Time:  ________ A.M.  P.M.       Patient Name:  ______________________________________________________  (please print)      Patient signature:  ___________________________________________________             Relationship to Patient:           []  Parent    Responsible person                          []  Spouse  In case of minor or                    [] Other  _____________   Incompetent name:  __________________________________________________                               (please print)      _____________      Responsible person  In case of minor or  Incompetent signature:  _______________________________________________    Statement of Physician  My signature below affirms that prior to the time of the procedure, I have explained to the patient and/or his/her guardian, the risks and benefits involved in the proposed treatment and any reasonable alternative to the proposed treatment.  I have also explained the risks and benefits involved in the refusal of the proposed treatment and have answered the patient's questions.                        Date:  ______/______/_______  Provider                      Signature:  __________________________________________________________       Time:  ___________ A.M    P.M.

## (undated) NOTE — LETTER
AUTHORIZATION FOR SURGICAL OPERATION OR OTHER PROCEDURE    1. I hereby authorize Dr. Schaefer/ Katarzyna Sadler PA-C, and [facility] staff assigned to my case to perform the following operation and/or procedure at the [facility] site:    _______________________________________________________________________________________________    Cortisone injection to Right knee  _______________________________________________________________________________________________    2.  My physician has explained the nature and purpose of the operation or other procedure, possible alternative methods of treatment, the risks involved, and the possibility of complication to me.  I acknowledge that no guarantee has been made as to the result that may be obtained.  3.  I recognize that, during the course of this operation, or other procedure, unforseen conditions may necessitate additional or different procedure than those listed above.  I, therefore, further authorize and request that the above named physician, his/her physician assistants or designees perform such procedures as are, in his/her professional opinion, necessary and desirable.  4.  Any tissue or organs removed in the operation or other procedure may be disposed of by and at the discretion of the [facility] and [facility].  5.  I understand that in the event of a medical emergency, I will be transported by local paramedics to [facility] or other hospital emergency department.  6.  I certify that I have read and fully understand the above consent to operation and/or other procedure.    7.  I acknowledge that my physician has explained sedation/analgesia administration to me including the risks and benefits.  I consent to the administration of sedation/analgesia as may be necessary or desirable in the judgement of my physician.    Witness signature: ___________________________________________________ Date:   ______/______/_____                    Time:  ________ A.M.  P.M.       Patient Name:  ______________________________________________________  (please print)      Patient signature:  ___________________________________________________             Relationship to Patient:           []  Parent    Responsible person                          []  Spouse  In case of minor or                    [] Other  _____________   Incompetent name:  __________________________________________________                               (please print)      _____________      Responsible person  In case of minor or  Incompetent signature:  _______________________________________________    Statement of Physician  My signature below affirms that prior to the time of the procedure, I have explained to the patient and/or his/her guardian, the risks and benefits involved in the proposed treatment and any reasonable alternative to the proposed treatment.  I have also explained the risks and benefits involved in the refusal of the proposed treatment and have answered the patient's questions.                        Date:  ______/______/_______  Provider                      Signature:  __________________________________________________________       Time:  ___________ A.M    P.M.

## (undated) NOTE — Clinical Note
Initial assessment completed with patient and her daughter Abbie.  Appointment scheduled for 2/11/25.  Daughter agrees to additional follow-up calls from nurse care manager.  Thank you!

## (undated) NOTE — Clinical Note
Thank you for the kind referral. Please feel free to reach out with any questions.   Ilya Bacon MD